# Patient Record
Sex: MALE | Race: WHITE | NOT HISPANIC OR LATINO | Employment: FULL TIME | URBAN - METROPOLITAN AREA
[De-identification: names, ages, dates, MRNs, and addresses within clinical notes are randomized per-mention and may not be internally consistent; named-entity substitution may affect disease eponyms.]

---

## 2017-03-15 ENCOUNTER — OFFICE VISIT (OUTPATIENT)
Dept: FAMILY MEDICINE | Facility: HOSPITAL | Age: 57
End: 2017-03-15
Attending: FAMILY MEDICINE
Payer: COMMERCIAL

## 2017-03-15 VITALS
DIASTOLIC BLOOD PRESSURE: 68 MMHG | HEART RATE: 76 BPM | WEIGHT: 194.88 LBS | HEIGHT: 72 IN | BODY MASS INDEX: 26.4 KG/M2 | SYSTOLIC BLOOD PRESSURE: 109 MMHG

## 2017-03-15 DIAGNOSIS — L98.9 SKIN LESION: ICD-10-CM

## 2017-03-15 DIAGNOSIS — N52.9 ERECTILE DYSFUNCTION, UNSPECIFIED ERECTILE DYSFUNCTION TYPE: ICD-10-CM

## 2017-03-15 DIAGNOSIS — L30.9 ECZEMA, UNSPECIFIED TYPE: ICD-10-CM

## 2017-03-15 DIAGNOSIS — Z12.11 ENCOUNTER FOR SCREENING COLONOSCOPY: ICD-10-CM

## 2017-03-15 DIAGNOSIS — Z00.00 ENCOUNTER FOR HEALTH MAINTENANCE EXAMINATION: Primary | ICD-10-CM

## 2017-03-15 PROCEDURE — 99214 OFFICE O/P EST MOD 30 MIN: CPT | Performed by: FAMILY MEDICINE

## 2017-03-15 RX ORDER — TADALAFIL 20 MG/1
20 TABLET ORAL
Qty: 10 TABLET | Refills: 12 | Status: SHIPPED | OUTPATIENT
Start: 2017-03-15 | End: 2017-03-15 | Stop reason: SDUPTHER

## 2017-03-15 RX ORDER — TRIAMCINOLONE ACETONIDE 1 MG/G
CREAM TOPICAL 2 TIMES DAILY
Qty: 45 G | Refills: 2 | Status: SHIPPED | OUTPATIENT
Start: 2017-03-15 | End: 2019-02-18

## 2017-03-15 RX ORDER — TADALAFIL 20 MG/1
20 TABLET ORAL
Qty: 10 TABLET | Refills: 12 | Status: SHIPPED | OUTPATIENT
Start: 2017-03-15 | End: 2018-08-08 | Stop reason: SDUPTHER

## 2017-03-15 NOTE — MR AVS SNAPSHOT
[facility]  [address], [address]  [place] [address]  Phone: [phone]  Fax: [phone]                  Aureliano Wilson   3/15/2017 4:00 PM   Office Visit    Description:  Male : 1960   Provider:  Shakeel Walton III, MD   Department:  [facility]           Reason for Visit     Follow-up           Diagnoses this Visit        Comments    Eczema, unspecified type    -  Primary     Encounter for screening colonoscopy         Erectile dysfunction, unspecified erectile dysfunction type         Skin lesion         Encounter for health maintenance examination                To Do List           Future Appointments        Provider Department Dept Phone    3/16/2017 5:00 PM APPOINTMENT LAB, [place] [facility] [phone]      Goals (5 Years of Data)     None      Follow-Up and Disposition     Return if symptoms worsen or fail to improve.       These Medications        Disp Refills Start End    triamcinolone acetonide 0.1% (KENALOG) 0.1 % cream 45 g 2 3/15/2017 3/25/2017    Apply topically 2 (two) times daily. - Topical (Top)    [facility] [address] [place] [address] [place]AN TREVIZO - [address] AT [facility] Kimberly Herrera Ph #: [phone]       tadalafil (CIALIS) 20 MG Tab 10 tablet 12 3/15/2017 3/15/2018    Take 1 tablet (20 mg total) by mouth every 72 hours as needed. - Oral    Pharmacy: [facility] [id number]  MAGAN TREVIZO - [address] AT [facility] [place]ly Herrera Ph #: [phone]         [facility]     [facility] -  Assistance  Registered nurses in the [facility] provide clinical advisement, health education, appointment booking, and other advisory services.  Call for this free service at [phone].             Medications           Message regarding Medications     Verify the changes and/or additions to your medication regime listed below are the same as  "discussed with your clinician today.  If any of these changes or additions are incorrect, please notify your healthcare provider.        START taking these NEW medications        Refills    triamcinolone acetonide 0.1% (KENALOG) 0.1 % cream 2    Sig: Apply topically 2 (two) times daily.    Class: Normal    Route: Topical (Top)           Verify that the below list of medications is an accurate representation of the medications you are currently taking.  If none reported, the list may be blank. If incorrect, please contact your healthcare provider. Carry this list with you in case of emergency.           Current Medications     tadalafil (CIALIS) 20 MG Tab Take 1 tablet (20 mg total) by mouth every 72 hours as needed.    triamcinolone acetonide 0.1% (KENALOG) 0.1 % cream Apply topically 2 (two) times daily.           Clinical Reference Information           Your Vitals Were     BP Pulse Height Weight BMI    109/68 76 5' 11.5" (1.816 m) 88.4 kg (194 lb 14.2 oz) 26.8 kg/m2      Blood Pressure          Most Recent Value    BP  109/68      Allergies as of 3/15/2017     No Known Allergies      Immunizations Administered on Date of Encounter - 3/15/2017     None      Orders Placed During Today's Visit      Normal Orders This Visit    Ambulatory referral to Dermatology     Ambulatory referral to Gastroenterology     Future Labs/Procedures Expected by Expires    CBC auto differential  3/15/2017 5/14/2018    Comprehensive metabolic panel  3/15/2017 5/14/2018    Lipid panel  3/15/2017 5/14/2018    TSH  3/15/2017 5/14/2018      Smoking Cessation     If you would like to quit smoking:   You may be eligible for free services if you are a [place] resident and started smoking cigarettes before September 1, 1988.  Call the Smoking Cessation Trust (SCT) toll free at (112) 454-7214 or (266) 919-3405.   Call 3-800-QUIT-NOW if you do not meet the above criteria.            Language Assistance Services     ATTENTION: Language " assistance services are available, free of charge. Please call [phone].      ATENCIÓN: Si habla temitope, tiene a richard disposición servicios gratuitos de asistencia lingüística. Llame al [phone].     CHÚ Ý: N?u b?n nói Ti?ng Vi?t, có các d?ch v? h? tr? ngôn ng? mi?n phí dành cho b?n. G?i s? [phone].         [facility] complies with applicable Federal civil rights laws and does not discriminate on the basis of race, color, national origin, age, disability, or sex.

## 2017-03-16 ENCOUNTER — LAB VISIT (OUTPATIENT)
Dept: LAB | Facility: HOSPITAL | Age: 57
End: 2017-03-16
Attending: FAMILY MEDICINE
Payer: COMMERCIAL

## 2017-03-16 DIAGNOSIS — Z00.00 ENCOUNTER FOR HEALTH MAINTENANCE EXAMINATION: ICD-10-CM

## 2017-03-16 LAB
ALBUMIN SERPL BCP-MCNC: 3.5 G/DL
ALP SERPL-CCNC: 56 U/L
ALT SERPL W/O P-5'-P-CCNC: 19 U/L
ANION GAP SERPL CALC-SCNC: 8 MMOL/L
AST SERPL-CCNC: 18 U/L
BASOPHILS # BLD AUTO: 0.05 K/UL
BASOPHILS NFR BLD: 1 %
BILIRUB SERPL-MCNC: 0.5 MG/DL
BUN SERPL-MCNC: 12 MG/DL
CALCIUM SERPL-MCNC: 9 MG/DL
CHLORIDE SERPL-SCNC: 105 MMOL/L
CHOLEST/HDLC SERPL: 4.8 {RATIO}
CO2 SERPL-SCNC: 25 MMOL/L
CREAT SERPL-MCNC: 0.9 MG/DL
DIFFERENTIAL METHOD: ABNORMAL
EOSINOPHIL # BLD AUTO: 0.2 K/UL
EOSINOPHIL NFR BLD: 4.2 %
ERYTHROCYTE [DISTWIDTH] IN BLOOD BY AUTOMATED COUNT: 12.7 %
EST. GFR  (AFRICAN AMERICAN): >60 ML/MIN/1.73 M^2
EST. GFR  (NON AFRICAN AMERICAN): >60 ML/MIN/1.73 M^2
GLUCOSE SERPL-MCNC: 95 MG/DL
HCT VFR BLD AUTO: 41.6 %
HDL/CHOLESTEROL RATIO: 20.7 %
HDLC SERPL-MCNC: 217 MG/DL
HDLC SERPL-MCNC: 45 MG/DL
HGB BLD-MCNC: 13.9 G/DL
LDLC SERPL CALC-MCNC: 149.8 MG/DL
LYMPHOCYTES # BLD AUTO: 1.5 K/UL
LYMPHOCYTES NFR BLD: 28.4 %
MCH RBC QN AUTO: 28.5 PG
MCHC RBC AUTO-ENTMCNC: 33.4 %
MCV RBC AUTO: 85 FL
MONOCYTES # BLD AUTO: 0.5 K/UL
MONOCYTES NFR BLD: 10.3 %
NEUTROPHILS # BLD AUTO: 2.9 K/UL
NEUTROPHILS NFR BLD: 55.9 %
NONHDLC SERPL-MCNC: 172 MG/DL
PLATELET # BLD AUTO: 232 K/UL
PMV BLD AUTO: 9.6 FL
POTASSIUM SERPL-SCNC: 3.7 MMOL/L
PROT SERPL-MCNC: 6.4 G/DL
RBC # BLD AUTO: 4.88 M/UL
SODIUM SERPL-SCNC: 138 MMOL/L
TRIGL SERPL-MCNC: 111 MG/DL
TSH SERPL DL<=0.005 MIU/L-ACNC: 1.43 UIU/ML
WBC # BLD AUTO: 5.25 K/UL

## 2017-03-16 PROCEDURE — 80061 LIPID PANEL: CPT

## 2017-03-16 PROCEDURE — 85025 COMPLETE CBC W/AUTO DIFF WBC: CPT

## 2017-03-16 PROCEDURE — 84443 ASSAY THYROID STIM HORMONE: CPT

## 2017-03-16 PROCEDURE — 80053 COMPREHEN METABOLIC PANEL: CPT

## 2017-03-16 PROCEDURE — 36415 COLL VENOUS BLD VENIPUNCTURE: CPT

## 2017-04-17 ENCOUNTER — OFFICE VISIT (OUTPATIENT)
Dept: NEUROLOGY | Facility: HOSPITAL | Age: 57
End: 2017-04-17
Attending: INTERNAL MEDICINE
Payer: COMMERCIAL

## 2017-04-17 VITALS
TEMPERATURE: 98 F | DIASTOLIC BLOOD PRESSURE: 75 MMHG | HEIGHT: 71 IN | BODY MASS INDEX: 26.88 KG/M2 | HEART RATE: 78 BPM | WEIGHT: 192 LBS | SYSTOLIC BLOOD PRESSURE: 110 MMHG

## 2017-04-17 DIAGNOSIS — Z12.11 ENCOUNTER FOR SCREENING COLONOSCOPY: Primary | ICD-10-CM

## 2017-04-17 PROCEDURE — 99214 OFFICE O/P EST MOD 30 MIN: CPT | Performed by: INTERNAL MEDICINE

## 2017-04-17 RX ORDER — POLYETHYLENE GLYCOL 3350, SODIUM SULFATE ANHYDROUS, SODIUM BICARBONATE, SODIUM CHLORIDE, POTASSIUM CHLORIDE 236; 22.74; 6.74; 5.86; 2.97 G/4L; G/4L; G/4L; G/4L; G/4L
4 POWDER, FOR SOLUTION ORAL ONCE
Qty: 4000 ML | Refills: 0 | Status: SHIPPED | OUTPATIENT
Start: 2017-04-17 | End: 2017-04-17

## 2017-04-17 NOTE — PATIENT INSTRUCTIONS
Nulytely Colonoscopy Prep Instructions    [facility] [place]   [address], [place] [address]  [phone]      PATIENT NAME:  Aureliano Allen    PROCEDURE DAY: Thursday May 4, 2017     *Your procedure time many change.  The hospital will contact you the day prior to   the procedure to confirm your procedure time and arrival.       CLEAR LIQUID DIET (START THE DAY BEFORE PROCEDURE):  Wednesday       Clear Liquid Diet means any liquid from the list below that is not red or purple in color:   Gatorade, Ramone-Aid, Lemonade (Yellow ONLY)-Gatorade is the preferred liquid   Tea (no milk or dairy)   Carbonated beverages (soft drink), regular or diet   Apple juice, white grape juice, white cranberry juice   Jell-O (orange, lemon, or lime flavors ONLY)   Clear, fat-free, beef or chicken broth   Bouillon, clear consommé   Snowball, Popsicles (NOT red or purple)  * No Solid Food or Alcohol     ITEMS TO BE PURCHASED FOR PREP (Nu-Lytely requires a prescription):         Nu-Lytely preparation solution.          Gas tablets (Gas-X, Mylanta Gas, Simethicone)     BOWEL PREP INSTRUCTIONS THE DAY BEFORE THE EXAM: Wednesday   1. Drink only clear liquids (see the above diet) all day. Gatorade is the best liquid.   Drink an extra 8 ounces of clear liquid every hour from 11am to 5pm.         2.   At 6pm, mix Nu-Lytely powder according to the directions on the container and               drink 8 ounces of solution every 10 minutes until about half of the solution is                consumed. Place the remainder of the solution in the refrigerator.         3.   At 9pm, take two gas tablets with 8 ounces of clear liquid.        4.   At 10pm, take two gas tablets with 8 ounces of clear liquid.      THE DAY OF THE EXAM: Thursday         1.  Beginning 5 hours before your procedure time, drink the remaining half of              Nu-Lytely solution. Drink 8 ounces of solution every 10 minutes until the solution               is gone.              *If your procedure is scheduled for the early morning, you will need to get up in               the middle of the night to take this dose of preparation. The correct timing of this               dose is essential to an effective preparation. If you do not take this dose, your               exam may be incomplete and need to be repeated.         2.  Have nothing to eat or drink for 3 hours before the procedure.         3.  Take your morning medications, if any, with a small sip of water.         4.  Bring someone to drive you home (you should not drive for 12 hrs after the exam)        5.  Report to Admitting, 1st floor hospital entrance 2 hours before procedure time.       If you are diabetic, do not take insulin or oral medications the morning of the procedure. Take only a half dose of insulin the day before your procedure. Do not take your diabetic pills the day of your procedure               Colonoscopy is used to view the inside of your lower digestive tract (colon and rectum). It can help screen for colon cancer and can also help find the source of abdominal pain, bleeding, and changes in bowel habits. The test is usually done in the hospital on an outpatient basis. During the exam, the doctor can remove a small tissue sample ( a biopsy) for testing. Small growths, such as polyps, may also be removed during colonoscopy.     A camera attached to a flexible tube with a viewing lens is used to take video pictures.    Getting Ready    Be sure to tell your doctor about any medications you take. Also tell your doctor about any health conditions you may have.   Discuss the risks of the test with your doctor. These include bleeding and bowel puncture.   Your rectum and colon must be empty for the test. So be sure to follow the diet and bowel prep instructions exactly. If you dont, the test may need to be rescheduled.   You will need to have a friend or family member prepared to drive you  home after the test.     Colonoscopy provides an inside view of the entire colon.    During the Test    You are given sedating (relaxing) medication through an IV line. You may be drowsy or completely asleep.   The procedure takes 30 minutes or longer.   The doctor performs a digital rectal exam to check for anal and rectal problems. The rectum is lubricated and the scope inserted.   If you are awake, you may have a feeling similar to needing to have a bowel movement. You may also feel pressure as air is pumped into the colon. Its okay to pass gas during the procedure.  After the Test    You may discuss the results with your doctor right away or at a future visit.   Try to pass all the gas right after the test to help prevent bloating and cramping.   After the test, you can go back to your normal eating and other activities.  Risks and Possible Complications Include:   Bleeding  A puncture or tear in the colon  Risks of anesthesia

## 2017-04-17 NOTE — MR AVS SNAPSHOT
[facility]  [address] [place] [address]  Phone: [phone]  Fax: [phone]                  Aureliano Wilson   2017 2:45 PM   Office Visit    Description:  Male : 1960   Provider:  Tung Cochran MD   Department:  [facility]           Reason for Visit     Consult           Diagnoses this Visit        Comments    Encounter for screening colonoscopy    -  Primary            To Do List           Goals (5 Years of Data)     None       These Medications        Disp Refills Start End    polyethylene glycol (GOLYTELY,NULYTELY) 236-22.74-6.74 -5.86 gram suspension 4000 mL 0 2017    Take 4,000 mLs (4 L total) by mouth once. - Oral    Pharmacy: [facility] [address] [place] [address] [address] AT [facility] Clif Herrera  #: [phone]         [facility] On Call     [facility] Care [facility] -  Assistance  Unless otherwise directed by your provider, please contact [facility], our nurse care line that is available for  assistance.     Registered nurses in the [facility] provide: appointment scheduling, clinical advisement, health education, and other advisory services.  Call: [phone] (toll free)               Medications           Message regarding Medications     Verify the changes and/or additions to your medication regime listed below are the same as discussed with your clinician today.  If any of these changes or additions are incorrect, please notify your healthcare provider.        START taking these NEW medications        Refills    polyethylene glycol (GOLYTELY,NULYTELY) 236-22.74-6.74 -5.86 gram suspension 0    Sig: Take 4,000 mLs (4 L total) by mouth once.    Class: Normal    Route: Oral           Verify that the below list of medications is an accurate representation of the medications you are currently taking.  If none reported, the list may be blank. If incorrect,  "please contact your healthcare provider. Carry this list with you in case of emergency.           Current Medications     polyethylene glycol (GOLYTELY,NULYTELY) 236-22.74-6.74 -5.86 gram suspension Take 4,000 mLs (4 L total) by mouth once.    tadalafil (CIALIS) 20 MG Tab Take 1 tablet (20 mg total) by mouth every 72 hours as needed.    triamcinolone acetonide 0.1% (KENALOG) 0.1 % cream Apply topically 2 (two) times daily.           Clinical Reference Information           Your Vitals Were     BP Pulse Temp Height Weight BMI    110/75 78 97.9 °F (36.6 °C) (Oral) 5' 11" (1.803 m) 87.1 kg (192 lb) 26.78 kg/m2      Blood Pressure          Most Recent Value    BP  110/75      Allergies as of 4/17/2017     No Known Allergies      Immunizations Administered on Date of Encounter - 4/17/2017     None      Orders Placed During Today's Visit      Normal Orders This Visit    Case request GI: COLONOSCOPY       Instructions    Nulyte Colonoscopy Prep Instructions    [facility] - [address] [place] [address]  [phone]      PATIENT NAME:  Aureliano Allen    PROCEDURE DAY: Thursday May 4, 2017     *Your procedure time many change.  The hospital will contact you the day prior to   the procedure to confirm your procedure time and arrival.       CLEAR LIQUID DIET (START THE DAY BEFORE PROCEDURE):  Wednesday       Clear Liquid Diet means any liquid from the list below that is not red or purple in color:   Gatorade, Ramone-Aid, Lemonade (Yellow ONLY)-Gatorade is the preferred liquid   Tea (no milk or dairy)   Carbonated beverages (soft drink), regular or diet   Apple juice, white grape juice, white cranberry juice   Jell-O (orange, lemon, or lime flavors ONLY)   Clear, fat-free, beef or chicken broth   Bouillon, clear consommé   Snowball, Popsicles (NOT red or purple)  * No Solid Food or Alcohol     ITEMS TO BE PURCHASED FOR PREP (Nu-Lytely requires a prescription):         Nu-Lytely " preparation solution.          Gas tablets (Gas-X, Mylanta Gas, Simethicone)     BOWEL PREP INSTRUCTIONS THE DAY BEFORE THE EXAM: Wednesday   1. Drink only clear liquids (see the above diet) all day. Gatorade is the best liquid.   Drink an extra 8 ounces of clear liquid every hour from 11am to 5pm.         2.   At 6pm, mix Nu-Lytely powder according to the directions on the container and               drink 8 ounces of solution every 10 minutes until about half of the solution is                consumed. Place the remainder of the solution in the refrigerator.         3.   At 9pm, take two gas tablets with 8 ounces of clear liquid.        4.   At 10pm, take two gas tablets with 8 ounces of clear liquid.      THE DAY OF THE EXAM: Thursday         1.  Beginning 5 hours before your procedure time, drink the remaining half of              Nu-Lytely solution. Drink 8 ounces of solution every 10 minutes until the solution              is gone.              *If your procedure is scheduled for the early morning, you will need to get up in               the middle of the night to take this dose of preparation. The correct timing of this               dose is essential to an effective preparation. If you do not take this dose, your               exam may be incomplete and need to be repeated.         2.  Have nothing to eat or drink for 3 hours before the procedure.         3.  Take your morning medications, if any, with a small sip of water.         4.  Bring someone to drive you home (you should not drive for 12 hrs after the exam)        5.  Report to Admitting, 1st floor hospital entrance 2 hours before procedure time.       If you are diabetic, do not take insulin or oral medications the morning of the procedure. Take only a half dose of insulin the day before your procedure. Do not take your diabetic pills the day of your procedure               Colonoscopy is used to view the inside of your lower digestive tract (colon  and rectum). It can help screen for colon cancer and can also help find the source of abdominal pain, bleeding, and changes in bowel habits. The test is usually done in the hospital on an outpatient basis. During the exam, the doctor can remove a small tissue sample ( a biopsy) for testing. Small growths, such as polyps, may also be removed during colonoscopy.     A camera attached to a flexible tube with a viewing lens is used to take video pictures.    Getting Ready    Be sure to tell your doctor about any medications you take. Also tell your doctor about any health conditions you may have.   Discuss the risks of the test with your doctor. These include bleeding and bowel puncture.   Your rectum and colon must be empty for the test. So be sure to follow the diet and bowel prep instructions exactly. If you dont, the test may need to be rescheduled.   You will need to have a friend or family member prepared to drive you home after the test.     Colonoscopy provides an inside view of the entire colon.    During the Test    You are given sedating (relaxing) medication through an IV line. You may be drowsy or completely asleep.   The procedure takes 30 minutes or longer.   The doctor performs a digital rectal exam to check for anal and rectal problems. The rectum is lubricated and the scope inserted.   If you are awake, you may have a feeling similar to needing to have a bowel movement. You may also feel pressure as air is pumped into the colon. Its okay to pass gas during the procedure.  After the Test    You may discuss the results with your doctor right away or at a future visit.   Try to pass all the gas right after the test to help prevent bloating and cramping.   After the test, you can go back to your normal eating and other activities.  Risks and Possible Complications Include:   Bleeding  A puncture or tear in the colon  Risks of anesthesia                Smoking Cessation     If you would like  to quit smoking:   You may be eligible for free services if you are a [place] resident and started smoking cigarettes before September 1, 1988.  Call the Smoking Cessation Trust (SCT) toll free at (069) 482-3640 or (232) 973-8893.   Call 1-800-QUIT-NOW if you do not meet the above criteria.   Contact us via email: [email or web address]   View our website for more information: [email or web address]        Language Assistance Services     ATTENTION: Language assistance services are available, free of charge. Please call [phone].      ATENCIÓN: Si habla español, tiene a richard disposición servicios gratuitos de asistencia lingüística. Llame al [phone].     CHÚ Ý: N?u b?n nói Ti?ng Vi?t, có các d?ch v? h? tr? ngôn ng? mi?n phí dành cho b?n. G?i s? [phone].         [facility] complies with applicable Federal civil rights laws and does not discriminate on the basis of race, color, national origin, age, disability, or sex.

## 2017-04-17 NOTE — PROGRESS NOTES
"[facility]    CC: fecal incontinence     HPI 57 y.o. male with PMH of erectile dysfunction who presents with 6 month history of intermittent fecal incontinence associated with nocturnal leakage but not associated with diarrhea or abdominal pain. Reports mild rectal bleeding on toilet tissue, not associated with straining.  He denies constipation, nausea, or vomiting.  He has never had a colonoscopy. No family history of colon cancer or gastric cancer.     PMH  Erectile Dysfunction    PSH  None    Social History  Rare alcohol use  1/2 PPD smoking x 30 years    FH  No family history of colon or gastric cancer    Review of Systems  General ROS: negative for chills, fever or weight loss  Psychological ROS: negative for hallucination, depression or suicidal ideation  Ophthalmic ROS: negative for blurry vision, photophobia or eye pain  ENT ROS: negative for epistaxis, sore throat or rhinorrhea  Respiratory ROS: no cough, shortness of breath, or wheezing  Cardiovascular ROS: no chest pain or dyspnea on exertion  Gastrointestinal ROS: no abdominal pain, change in bowel habits, no melena or hematochezia,+rectal bleeding, +fecal incontinence  Genito-Urinary ROS: no dysuria, trouble voiding, or hematuria  Musculoskeletal ROS: negative for gait disturbance or muscular weakness  Neurological ROS: no syncope or seizures; no ataxia  Dermatological ROS: negative for pruritis, rash and jaundice    Physical Examination  /75  Pulse 78  Temp 97.9 °F (36.6 °C) (Oral)   Ht 5' 11" (1.803 m)  Wt 87.1 kg (192 lb)  BMI 26.78 kg/m2  General appearance: alert, cooperative, no distress  HENT: Normocephalic, atraumatic, neck symmetrical, no nasal discharge   Eyes: conjunctivae/corneas clear, PERRL, EOM's intact  Lungs: clear to auscultation bilaterally, no dullness to percussion bilaterally  Heart: regular rate and rhythm without rub; no displacement of the PMI   Abdomen: soft, non-tender; bowel sounds normoactive; no " organomegaly  Extremities: extremities symmetric; no clubbing, cyanosis, or edema  Integument: Skin color, texture, turgor normal; no rashes; hair distrubution normal  Neurologic: Alert and oriented X 3, normal strength, normal coordination and gait  Psychiatric: no pressured speech; normal affect; no evidence of impaired cognition     Labs:  H/h 13.9/41.6  Cr 0.9     Imaging:  No imaging listed in system     Assessment: 57 year old WM with fecal incontinence and need for screening colonoscopy.    Plan:   -Trial of daily Immodium for occasional mild noctural fecal incontinence.   -Colonoscopy scheduled for May 4th.  Reassess for improvement of symptoms at that time.  Will evaluate for structural abnormality as possible cause of fecal incontinence, as well as perform full screening colonoscopy.      Tung Cochran MD   [address] [place] [address], [address]   [place] [address] [phone]

## 2017-04-18 ENCOUNTER — TELEPHONE (OUTPATIENT)
Dept: NEUROLOGY | Facility: HOSPITAL | Age: 57
End: 2017-04-18

## 2017-04-18 NOTE — TELEPHONE ENCOUNTER
----- Message from Frida Joseph LPN sent at 4/17/2017  3:59 PM CDT -----  Pt scheduled 5/4 for c-scope  Cpt- g0121  Dx- z12.11  Thanks

## 2017-04-24 ENCOUNTER — TELEPHONE (OUTPATIENT)
Dept: NEUROLOGY | Facility: HOSPITAL | Age: 57
End: 2017-04-24

## 2017-04-24 NOTE — TELEPHONE ENCOUNTER
----- Message from Alexandra Simmons sent at 4/24/2017 12:50 PM CDT -----  Contact: Patients wife, Araceli RICHTER- Patients wife called to  Reschedule the Colonoscopy. Please call Araceli Wilson at [phone].

## 2017-04-24 NOTE — TELEPHONE ENCOUNTER
Returned pts wife call. Wishing to reschedule screening colonoscopy. Offered date of 5/8/2017 to which Araceli and pt accept. Scheduling notified and auth requested.

## 2017-04-25 ENCOUNTER — TELEPHONE (OUTPATIENT)
Dept: NEUROLOGY | Facility: HOSPITAL | Age: 57
End: 2017-04-25

## 2017-04-25 NOTE — TELEPHONE ENCOUNTER
----- Message from Janeth Briones LPN sent at 4/24/2017  1:15 PM CDT -----  David Zuniga,   Can we please change the date on this pts procedure? Changing to 5/8  DX: Z12.11  CPT:     Thanks!  Janeth

## 2017-05-02 ENCOUNTER — TELEPHONE (OUTPATIENT)
Dept: NEUROLOGY | Facility: HOSPITAL | Age: 57
End: 2017-05-02

## 2017-05-02 DIAGNOSIS — Z12.11 SCREEN FOR COLON CANCER: Primary | ICD-10-CM

## 2017-05-02 NOTE — TELEPHONE ENCOUNTER
----- Message from Isadora Blue sent at 5/2/2017 11:25 AM CDT -----  GI- Patient's wife (Araceli) called regarding her husbands colonoscopy that was suppose to be changed to May 8. Please call Araceli back at [phone]. Thanks.

## 2017-05-02 NOTE — TELEPHONE ENCOUNTER
Returned pts wife call. Araceli states that c-scope was originally scheduled for 5/4 and called on 4/24 and rescheduled to 5/8. Apologized to pts wife as it appears that case never got moved to 5/8 due to Dr. Cochran not being available for procedures that day. Pts wife selected date of 6/5 to reschedule procedure. Will notify surgery scheduling.

## 2017-06-05 ENCOUNTER — HOSPITAL ENCOUNTER (OUTPATIENT)
Facility: HOSPITAL | Age: 57
Discharge: HOME | End: 2017-06-05
Attending: INTERNAL MEDICINE | Admitting: INTERNAL MEDICINE
Payer: COMMERCIAL

## 2017-06-05 ENCOUNTER — ANESTHESIA (OUTPATIENT)
Dept: ENDOSCOPY | Facility: HOSPITAL | Age: 57
End: 2017-06-05
Payer: COMMERCIAL

## 2017-06-05 ENCOUNTER — ANESTHESIA EVENT (OUTPATIENT)
Dept: ENDOSCOPY | Facility: HOSPITAL | Age: 57
End: 2017-06-05
Payer: COMMERCIAL

## 2017-06-05 ENCOUNTER — SURGERY (OUTPATIENT)
Age: 57
End: 2017-06-05

## 2017-06-05 VITALS
HEIGHT: 71 IN | RESPIRATION RATE: 16 BRPM | WEIGHT: 188 LBS | SYSTOLIC BLOOD PRESSURE: 127 MMHG | DIASTOLIC BLOOD PRESSURE: 88 MMHG | OXYGEN SATURATION: 99 % | TEMPERATURE: 98 F | HEART RATE: 75 BPM | BODY MASS INDEX: 26.32 KG/M2

## 2017-06-05 DIAGNOSIS — Z12.11 SCREENING FOR COLON CANCER: Primary | ICD-10-CM

## 2017-06-05 DIAGNOSIS — K63.5 POLYP OF COLON, UNSPECIFIED PART OF COLON, UNSPECIFIED TYPE: ICD-10-CM

## 2017-06-05 PROCEDURE — 37000008 HC ANESTHESIA 1ST 15 MINUTES: Performed by: INTERNAL MEDICINE

## 2017-06-05 PROCEDURE — 88305 TISSUE EXAM BY PATHOLOGIST: CPT | Performed by: PATHOLOGY

## 2017-06-05 PROCEDURE — 25000003 PHARM REV CODE 250: Performed by: INTERNAL MEDICINE

## 2017-06-05 PROCEDURE — 45385 COLONOSCOPY W/LESION REMOVAL: CPT | Performed by: INTERNAL MEDICINE

## 2017-06-05 PROCEDURE — 25000003 PHARM REV CODE 250: Performed by: NURSE ANESTHETIST, CERTIFIED REGISTERED

## 2017-06-05 PROCEDURE — 27201012 HC FORCEPS, HOT/COLD, DISP: Performed by: INTERNAL MEDICINE

## 2017-06-05 PROCEDURE — 27201089 HC SNARE, DISP (ANY): Performed by: INTERNAL MEDICINE

## 2017-06-05 PROCEDURE — 45380 COLONOSCOPY AND BIOPSY: CPT | Performed by: INTERNAL MEDICINE

## 2017-06-05 PROCEDURE — 37000009 HC ANESTHESIA EA ADD 15 MINS: Performed by: INTERNAL MEDICINE

## 2017-06-05 PROCEDURE — 63600175 PHARM REV CODE 636 W HCPCS: Performed by: NURSE ANESTHETIST, CERTIFIED REGISTERED

## 2017-06-05 PROCEDURE — 88305 TISSUE EXAM BY PATHOLOGIST: CPT | Mod: 26,,, | Performed by: PATHOLOGY

## 2017-06-05 RX ORDER — SODIUM CHLORIDE 9 MG/ML
INJECTION, SOLUTION INTRAVENOUS CONTINUOUS
Status: DISCONTINUED | OUTPATIENT
Start: 2017-06-05 | End: 2017-06-13 | Stop reason: HOSPADM

## 2017-06-05 RX ORDER — PROPOFOL 10 MG/ML
VIAL (ML) INTRAVENOUS
Status: DISCONTINUED | OUTPATIENT
Start: 2017-06-05 | End: 2017-06-05

## 2017-06-05 RX ORDER — PROPOFOL 10 MG/ML
VIAL (ML) INTRAVENOUS CONTINUOUS PRN
Status: DISCONTINUED | OUTPATIENT
Start: 2017-06-05 | End: 2017-06-05

## 2017-06-05 RX ORDER — LIDOCAINE HCL/PF 100 MG/5ML
SYRINGE (ML) INTRAVENOUS
Status: DISCONTINUED | OUTPATIENT
Start: 2017-06-05 | End: 2017-06-05

## 2017-06-05 RX ADMIN — PROPOFOL 150 MCG/KG/MIN: 10 INJECTION, EMULSION INTRAVENOUS at 10:06

## 2017-06-05 RX ADMIN — PROPOFOL 70 MG: 10 INJECTION, EMULSION INTRAVENOUS at 10:06

## 2017-06-05 RX ADMIN — SODIUM CHLORIDE: 0.9 INJECTION, SOLUTION INTRAVENOUS at 10:06

## 2017-06-05 RX ADMIN — LIDOCAINE HYDROCHLORIDE 80 MG: 20 INJECTION, SOLUTION INTRAVENOUS at 10:06

## 2017-06-05 RX ADMIN — PROPOFOL 30 MG: 10 INJECTION, EMULSION INTRAVENOUS at 10:06

## 2017-06-05 NOTE — ANESTHESIA POSTPROCEDURE EVALUATION
"Anesthesia Post Evaluation    Patient: Aureliano Wilson    Procedure(s) Performed: Procedure(s) (LRB):  COLONOSCOPY (N/A)    Final Anesthesia Type: MAC  Patient location during evaluation: GI PACU  Patient participation: Yes- Able to Participate  Level of consciousness: awake and alert and oriented  Post-procedure vital signs: reviewed and stable  Pain management: adequate  Airway patency: patent  PONV status at discharge: No PONV  Anesthetic complications: no      Cardiovascular status: blood pressure returned to baseline, hemodynamically stable and stable  Respiratory status: unassisted, spontaneous ventilation and room air  Hydration status: euvolemic  Follow-up not needed.        Visit Vitals  /74 (BP Location: Left arm, Patient Position: Lying, BP Method: Automatic)   Pulse 77   Temp 36.6 °C (97.9 °F) (Oral)   Resp 18   Ht 5' 11" (1.803 m)   Wt 85.3 kg (188 lb)   SpO2 99%   BMI 26.22 kg/m²       Pain/Koby Score: Pain Assessment Performed: Yes (6/5/2017 10:20 AM)  Presence of Pain: denies (6/5/2017 10:20 AM)      "

## 2017-06-05 NOTE — H&P
[facility]    CC: fecal incontinence    HPI 57 y.o. male PMH of erectile dysfunction who presents with 6 month history of intermittent fecal incontinence associated with nocturnal leakage but not associated with diarrhea or abdominal pain. Reports mild rectal bleeding on toilet tissue, not associated with straining.  He denies constipation, nausea, or vomiting.  He has never had a colonoscopy. No family history of colon cancer or gastric cancer.       Past Medical History:   Diagnosis Date    Erectile dysfunction          Review of Systems  General ROS: negative for chills, fever or weight loss  Cardiovascular ROS: no chest pain or dyspnea on exertion  Gastrointestinal ROS: no abdominal pain, no blood in stooll mild fecal incontinence    Physical Examination  There were no vitals taken for this visit.  General appearance: alert, cooperative, no distress  HENT: Normocephalic, atraumatic, neck symmetrical, no nasal discharge   Lungs: clear to auscultation bilaterally, no dullness to percussion bilaterally  Heart: regular rate and rhythm without rub; no displacement of the PMI   Abdomen: soft, non-tender; bowel sounds normoactive; no organomegaly  Extremities: extremities symmetric; no clubbing, cyanosis, or edema  Neurologic: Alert and oriented X 3, normal strength, normal coordination and gait    Labs:  Lab Results   Component Value Date    WBC 5.25 03/16/2017    HGB 13.9 (L) 03/16/2017    HCT 41.6 03/16/2017    MCV 85 03/16/2017     03/16/2017         Imaging:  none    Assessment:   57 year old WM with fecal incontinence and need for screening colonoscopy.       Plan: Colonoscopy today      Tung Cochran MD   [address] [facility], [address]   [place] [address] [phone]

## 2017-06-05 NOTE — TRANSFER OF CARE
"Anesthesia Transfer of Care Note    Patient: Aureliano Wilson    Procedure(s) Performed: Procedure(s) (LRB):  COLONOSCOPY (N/A)    Patient location: GI    Anesthesia Type: MAC    Transport from OR: Transported from OR on room air with adequate spontaneous ventilation    Post pain: adequate analgesia    Post assessment: no apparent anesthetic complications and tolerated procedure well    Post vital signs: stable    Level of consciousness: awake, alert and oriented    Nausea/Vomiting: no nausea/vomiting    Complications: none    Transfer of care protocol was followed      Last vitals:   Visit Vitals  /74 (BP Location: Left arm, Patient Position: Lying, BP Method: Automatic)   Pulse 77   Temp 36.6 °C (97.9 °F) (Oral)   Resp 18   Ht 5' 11" (1.803 m)   Wt 85.3 kg (188 lb)   SpO2 99%   BMI 26.22 kg/m²     "

## 2017-06-05 NOTE — DISCHARGE INSTRUCTIONS
Discharge Summary/Instructions for after Colonoscopy with Biopsy/Polypectomy    Aureliano Wilson  6/5/2017  Tung Cochran MD    Restrictions on Activity:    - Do not drive car or operate machinery until the day after the procedure.  - The following day: return to full activity including work.  - For 3 days: No heavy lifting, straining or running.  - Diet: Eat and drink normally unless instructed otherwise.    Treatment for Common Side Effects:  - Mild abdominal pain and bloating or excessive gas: rest, eat lightly and use a heating pad.     Symptoms to watch for and report to your physician:  1. Severe abdominal pain.  2. Fever within 24 hours after a procedure.  3. A large amount of rectal bleeding. (A small amount of blood from the rectum is not serious, especially if hemorrhoids are present.)  4. Because air was put into your colon during the procedure, expelling large amount of air from your rectum is normal.  5. You may not have a bowel movement for 1-3 days because of the colonoscopy prep. This is normal.  6. Go directly to the emergency room if you notice any of the following:     Chills and/or fever over 101   Persistent vomiting   Severe abdominal pain, other than gas cramps   Severe chest pain   Black, tarry stools   Any bleeding - exceeding one tablespoon    If you have any questions or problems, please call your Physician:    Tung Cochran MD      Lab Results: Contact Physician's Office      If a complication or emergency situation arises and you are unable to reach your Physician - GO TO THE EMERGENCY ROOM.

## 2017-06-05 NOTE — ANESTHESIA PREPROCEDURE EVALUATION
06/05/2017  Aureliano Wilson is a 57 y.o., male for colonoscopy under MAC    Anesthesia Evaluation     I have reviewed the Nursing Notes.   I have reviewed the Medications.     Review of Systems  Social:  Smoker, No Alcohol Use   Cardiovascular:   Exercise tolerance: good    Hepatic/GI:   Bowel Prep.        Physical Exam  General:  Well nourished       Chest/Lungs:  Chest/Lungs Clear    Heart/Vascular:  Heart Findings: Normal            Anesthesia Plan  Type of Anesthesia, risks & benefits discussed:  Anesthesia Type:  MAC  Patient's Preference:   Intra-op Monitoring Plan:   Intra-op Monitoring Plan Comments:   Post Op Pain Control Plan:   Post Op Pain Control Plan Comments:   Induction:    Beta Blocker:  Patient is not currently on a Beta-Blocker (No further documentation required).       Informed Consent: Patient understands risks and agrees with Anesthesia plan.  Questions answered. Anesthesia consent signed with patient.  ASA Score: 2     Day of Surgery Review of History & Physical:            Ready For Surgery From Anesthesia Perspective.

## 2017-08-07 NOTE — PROGRESS NOTES
"Subjective:       Patient ID: Aureliano Wilson is a 57 y.o. male.    Chief Complaint: Follow-up    HPI      Aureliano Wilson is a 57 y.o. male who presents for an annual health visit.        Review of Systems   Genitourinary:        ED   Skin: Positive for rash.   All other systems reviewed and are negative.      Cardiac risk factors: advanced age (older than 55 for men, 65 for women), male gender, sedentary lifestyle and smoking/ tobacco exposure.    Depression Screen  (Note: if answer to either of the following is "Yes", a more complete depression screening is indicated)   Q1: Over the past two weeks, have you felt down, depressed or hopeless? no  Q2: Over the past two weeks, have you felt little interest or pleasure in doing things? no    Falls Risk Assessment- Falls Risk/Prevention discussed: Yes      Pain Assessment- denies pain at this time     Past Medical History:   Diagnosis Date    Erectile dysfunction        Family History   Problem Relation Age of Onset    Diabetes Father     Stroke Father     Diabetes Mother        Social History     Social History    Marital status:      Spouse name: N/A    Number of children: N/A    Years of education: N/A     Social History Main Topics    Smoking status: Current Every Day Smoker     Packs/day: 0.25     Years: 30.00    Smokeless tobacco: None      Comment: 5 cigarettes a day    Alcohol use Yes      Comment: socially    Drug use: No    Sexual activity: Yes     Partners: Female     Other Topics Concern    None     Social History Narrative    None       Past Surgical History:    none     Current Outpatient Prescriptions:     tadalafil (CIALIS) 20 MG Tab, Take 1 tablet (20 mg total) by mouth every 72 hours as needed., Disp: 10 tablet, Rfl: 12    triamcinolone acetonide 0.1% (KENALOG) 0.1 % cream, Apply topically 2 (two) times daily., Disp: 45 g, Rfl: 2       Immunization status: needs influenza.  Objective:      Blood pressure 109/68, pulse 76, height " "5' 11.5" (1.816 m), weight 88.4 kg (194 lb 14.2 oz).  Body mass index is 26.8 kg/m².    Physical Exam   Constitutional: He is oriented to person, place, and time. He appears well-developed and well-nourished.   HENT:   Head: Normocephalic and atraumatic.   Mouth/Throat: Oropharynx is clear and moist.   Eyes: Conjunctivae and EOM are normal. Pupils are equal, round, and reactive to light. No scleral icterus.   Neck: Normal range of motion. Neck supple. No thyromegaly present.   Cardiovascular: Normal rate, regular rhythm, normal heart sounds and intact distal pulses.    Pulmonary/Chest: Effort normal and breath sounds normal.   Abdominal: Soft. Bowel sounds are normal.   Musculoskeletal: Normal range of motion.   Neurological: He is alert and oriented to person, place, and time.   Skin: Capillary refill takes less than 2 seconds. Rash noted.   Psychiatric: He has a normal mood and affect. His behavior is normal. Judgment and thought content normal.       Assessment:       1. Encounter for health maintenance examination    2. Eczema, unspecified type    3. Encounter for screening colonoscopy    4. Erectile dysfunction, unspecified erectile dysfunction type    5. Skin lesion        Plan:       Encounter for health maintenance examination  -     CBC auto differential; Future; Expected date: 03/15/2017  -     TSH; Future; Expected date: 03/15/2017  -     Comprehensive metabolic panel; Future; Expected date: 03/15/2017  -     Lipid panel; Future; Expected date: 03/15/2017    Eczema, unspecified type  -     triamcinolone acetonide 0.1% (KENALOG) 0.1 % cream; Apply topically 2 (two) times daily.  Dispense: 45 g; Refill: 2    Encounter for screening colonoscopy  -     Ambulatory referral to Gastroenterology    Erectile dysfunction, unspecified erectile dysfunction type  -     Discontinue: tadalafil (CIALIS) 20 MG Tab; Take 1 tablet (20 mg total) by mouth every 72 hours as needed.  Dispense: 10 tablet; Refill: 12  -     " tadalafil (CIALIS) 20 MG Tab; Take 1 tablet (20 mg total) by mouth every 72 hours as needed.  Dispense: 10 tablet; Refill: 12    Skin lesion  -     Ambulatory referral to Dermatology      During the course of the visit the patient was educated and counseled about appropriate screening and preventive services including:   · Colorectal cancer screening    Assistance with smoking cessation was offered, including:  [x]  Medications  [x]  Counseling  [x]  Printed Information on Smoking Cessation  [x]  Referral to a Smoking Cessation Program    Patient was counseled regarding smoking for 3-10 minutes.    Return if symptoms worsen or fail to improve.

## 2018-02-16 ENCOUNTER — PATIENT MESSAGE (OUTPATIENT)
Dept: FAMILY MEDICINE | Facility: HOSPITAL | Age: 58
End: 2018-02-16

## 2018-02-28 ENCOUNTER — HOSPITAL ENCOUNTER (OUTPATIENT)
Dept: RADIOLOGY | Facility: HOSPITAL | Age: 58
Discharge: HOME | End: 2018-02-28
Attending: FAMILY MEDICINE
Payer: COMMERCIAL

## 2018-02-28 ENCOUNTER — OFFICE VISIT (OUTPATIENT)
Dept: FAMILY MEDICINE | Facility: HOSPITAL | Age: 58
End: 2018-02-28
Attending: FAMILY MEDICINE
Payer: COMMERCIAL

## 2018-02-28 VITALS
SYSTOLIC BLOOD PRESSURE: 125 MMHG | HEIGHT: 71 IN | BODY MASS INDEX: 26.15 KG/M2 | RESPIRATION RATE: 20 BRPM | DIASTOLIC BLOOD PRESSURE: 76 MMHG | HEART RATE: 78 BPM | WEIGHT: 186.75 LBS

## 2018-02-28 DIAGNOSIS — L57.0 ACTINIC KERATOSIS: ICD-10-CM

## 2018-02-28 DIAGNOSIS — R05.9 COUGH: Primary | ICD-10-CM

## 2018-02-28 DIAGNOSIS — R05.9 COUGH: ICD-10-CM

## 2018-02-28 PROCEDURE — 71046 X-RAY EXAM CHEST 2 VIEWS: CPT | Mod: 26,,, | Performed by: RADIOLOGY

## 2018-02-28 PROCEDURE — 71046 X-RAY EXAM CHEST 2 VIEWS: CPT | Mod: TC,FY

## 2018-02-28 PROCEDURE — 99214 OFFICE O/P EST MOD 30 MIN: CPT | Mod: 25 | Performed by: FAMILY MEDICINE

## 2018-02-28 NOTE — PATIENT INSTRUCTIONS
Understanding Actinic Keratosis     Wear a hat that protects your face and ears from the sun.     Actinic keratosis is a skin growth caused by sun damage. These growths are not cancer, but they may develop into skin cancer (precancerous). Many people get these growths, especially as they age. This is because of cumulative sun exposure over years. Multiple growths are called actinic keratoses.  What causes actinic keratosis?  Sun damage causes actinic keratosis. These growths usually appear on skin that is most often exposed to the sun, such as the face, ears, or back of the hands. People who easily sunburn are likely to develop actinic keratoses. Actinic keratoses often appear later in life from cumulative, extensive sun exposure.  What are the symptoms of actinic keratosis?  Actinic keratosis growths may be described as:  · Rough, like sandpaper  · Wart-like  · Scaly or scabby  · More easily felt than seen  They may appear singly or in clusters. They may start out as red patches, and the skin around them may be discolored.  Actinic keratoses usually are not painful. For some people they may make the skin feel tender.  How is actinic keratosis treated?  Because actinic keratoses may develop into skin cancer, a healthcare provider should look at them. Your healthcare provider may choose to remove one or more of these growths. It may be examined under a microscope. This is called a biopsy. You may also wish to have actinic keratoses removed if they bother you. They can be removed in several ways:  · By using a medicine on the skin such as 5 fluorouracil or imiquimod  · By removing them with a scalpel  · By freezing them with liquid nitrogen  How can I prevent actinic keratoses?  Avoiding sun damage to your skin is the best way to prevent actinic keratoses. Here are some ways to protect your skin:  · Use sunscreen with an SPF of 30 or higher on exposed skin when you are outside.  · Wear a hat to protect your face and  scalp. Consider wearing clothing that covers your arms and legs.  · Avoid the sun in the middle of the day, when sunlight is most direct.  · Be aware of how long you have been out in the sun. Reapply sunscreen according to package directions.  · Do not use tanning beds.  What are the complications of actinic keratosis?  Actinic keratoses are a sign of skin damage. They may become cancerous. Its a good idea to ask your healthcare provider to check new skin growths. Report any skin problem that concerns you.  When should I call my healthcare provider?  Call your healthcare provider right away if any of these occur:  · You have an actinic keratosis sore that does not respond to treatment  · You have an actinic keratosis sore that does not heal within a few weeks, or heals and then comes back  · You have a skin growth that is changing in size, shape, or color  · You have a skin growth that looks different on one side from the other  · You have a skin growth that is not the same color throughout   Date Last Reviewed: 5/1/2016  © 3877-0838 [employer]. [address] [place] [address], [place] [address]. All rights reserved. This information is not intended as a substitute for professional medical care. Always follow your healthcare professional's instructions.

## 2018-02-28 NOTE — PROGRESS NOTES
Subjective:       Patient ID: Aureliano Wilson is a 58 y.o. male.    Chief Complaint: Follow-up    HPI     He c/o coughing, congestion, chest, and back pains; no fever, which began 7-10 days ago.  Pt used a diffuser and vaporizer containing eucalyptus that helps with the congestion as well as a heating pad for the back pain in located in upper quadrant of my back; however, the back pain migrated to the right side of my body and feel as though the pain is now emminating from internal organs.    Review of Systems   Respiratory: Positive for cough.    All other systems reviewed and are negative.      Past Medical History:   Diagnosis Date    Erectile dysfunction        Family History   Problem Relation Age of Onset    Diabetes Father     Stroke Father     Diabetes Mother        Social History     Social History    Marital status:      Spouse name: N/A    Number of children: N/A    Years of education: N/A     Social History Main Topics    Smoking status: Current Every Day Smoker     Packs/day: 0.25     Years: 30.00    Smokeless tobacco: None      Comment: 5 cigarettes a day    Alcohol use Yes      Comment: socially    Drug use: No    Sexual activity: Yes     Partners: Female     Other Topics Concern    None     Social History Narrative    None       Past Surgical History:   Procedure Laterality Date    COLONOSCOPY N/A 6/5/2017    Procedure: COLONOSCOPY;  Surgeon: Tung Cochran MD;  Location: [facility];  Service: Endoscopy;  Laterality: N/A;         Current Outpatient Prescriptions:     tadalafil (CIALIS) 20 MG Tab, Take 1 tablet (20 mg total) by mouth every 72 hours as needed., Disp: 10 tablet, Rfl: 12    triamcinolone acetonide 0.1% (KENALOG) 0.1 % cream, Apply topically 2 (two) times daily., Disp: 45 g, Rfl: 2     Objective:      Vitals:    02/28/18 1617   BP: 125/76   Pulse: 78   Resp: 20     Physical Exam   Constitutional: He is oriented to person, place, and time. He appears  well-nourished.   HENT:   Head: Normocephalic and atraumatic.   Eyes: Conjunctivae are normal.   Neck: Normal range of motion. Neck supple.   Cardiovascular: Normal rate, regular rhythm, normal heart sounds and intact distal pulses.    Pulmonary/Chest: Effort normal and breath sounds normal.   Abdominal: Soft. Bowel sounds are normal.   Musculoskeletal: Normal range of motion.   Neurological: He is alert and oriented to person, place, and time.   Skin: Skin is warm and dry. Capillary refill takes less than 2 seconds. Lesion and rash noted. Rash is maculopapular.   Psychiatric: He has a normal mood and affect. His behavior is normal. Judgment and thought content normal.         Assessment:       1. Cough    2. Actinic keratosis        Plan:       Cough  -     CBC auto differential; Future; Expected date: 02/28/2018  -     Comprehensive metabolic panel; Future; Expected date: 02/28/2018  -     X-Ray Chest PA And Lateral; Future; Expected date: 02/28/2018    Actinic keratosis  -     Ambulatory referral to Dermatology      Follow-up in about 4 weeks (around 3/28/2018).

## 2018-08-08 DIAGNOSIS — N52.9 ERECTILE DYSFUNCTION, UNSPECIFIED ERECTILE DYSFUNCTION TYPE: ICD-10-CM

## 2018-08-08 RX ORDER — TADALAFIL 20 MG/1
TABLET, FILM COATED ORAL
Qty: 10 TABLET | Refills: 0 | Status: SHIPPED | OUTPATIENT
Start: 2018-08-08 | End: 2021-12-06

## 2018-12-12 ENCOUNTER — OFFICE VISIT (OUTPATIENT)
Dept: FAMILY MEDICINE | Facility: HOSPITAL | Age: 58
End: 2018-12-12
Attending: FAMILY MEDICINE
Payer: COMMERCIAL

## 2018-12-12 VITALS
HEIGHT: 72 IN | HEART RATE: 86 BPM | WEIGHT: 195.56 LBS | DIASTOLIC BLOOD PRESSURE: 83 MMHG | SYSTOLIC BLOOD PRESSURE: 128 MMHG | BODY MASS INDEX: 26.49 KG/M2

## 2018-12-12 DIAGNOSIS — N45.1 EPIDIDYMITIS: Primary | ICD-10-CM

## 2018-12-12 PROCEDURE — 99213 OFFICE O/P EST LOW 20 MIN: CPT | Performed by: FAMILY MEDICINE

## 2018-12-12 RX ORDER — LEVOFLOXACIN 500 MG/1
500 TABLET, FILM COATED ORAL DAILY
Qty: 10 TABLET | Refills: 0 | Status: SHIPPED | OUTPATIENT
Start: 2018-12-12 | End: 2018-12-22

## 2018-12-12 NOTE — PROGRESS NOTES
Subjective:       Patient ID: Aureliano Wilson is a 58 y.o. male.    Chief Complaint: Testicle Pain    Mr. Wilson is a 58 year old male who presents urgently for testicular pain. States it started two weeks ago after his putting on his pants. States the pain is new. Reports discomfort with sitting, worse with movement and irritation. Denies any swelling or rash. Not on any medications currently, used to take cialis.     States he was in [place], [place]. States he had a prostate biopsy about six years ago, states he was diagnosed with prostatitis. States he occasionally has issues starting his stream, incomplete emptying.     No family history of prostate cancer.       Review of Systems   Constitutional: Negative for fever.   Neurological: Negative for headaches.       Objective:      Vitals:    12/12/18 0940   BP: 128/83   Pulse: 86     Physical Exam   Constitutional: He appears well-developed and well-nourished. No distress.   Genitourinary:   Genitourinary Comments: Normal penis, normal testicles, TTP over epididymus. No rash or abnormal skin findings.          Assessment:       1. Epididymitis        Plan:       Epididymitis  -     levoFLOXacin (LEVAQUIN) 500 MG tablet; Take 1 tablet (500 mg total) by mouth once daily. for 10 days  Dispense: 10 tablet; Refill: 0    Low suspicion for torsion or cancer based on exam. Will trial ten days of abx for likely epididymitis with f/u in two weeks. Consider ultrasound/rectal exam if symptoms continue. Counseled patient on BPH symptoms and medications as well.     Follow-up in about 2 weeks (around 12/26/2018).

## 2019-01-03 ENCOUNTER — OFFICE VISIT (OUTPATIENT)
Dept: FAMILY MEDICINE | Facility: HOSPITAL | Age: 59
End: 2019-01-03
Attending: FAMILY MEDICINE
Payer: COMMERCIAL

## 2019-01-03 VITALS
DIASTOLIC BLOOD PRESSURE: 74 MMHG | BODY MASS INDEX: 26.49 KG/M2 | WEIGHT: 195.56 LBS | HEART RATE: 62 BPM | SYSTOLIC BLOOD PRESSURE: 137 MMHG | HEIGHT: 72 IN

## 2019-01-03 DIAGNOSIS — N45.1 EPIDIDYMITIS: ICD-10-CM

## 2019-01-03 DIAGNOSIS — N52.9 ERECTILE DYSFUNCTION, UNSPECIFIED ERECTILE DYSFUNCTION TYPE: Primary | ICD-10-CM

## 2019-01-03 PROCEDURE — 99213 OFFICE O/P EST LOW 20 MIN: CPT | Performed by: FAMILY MEDICINE

## 2019-01-03 RX ORDER — SILDENAFIL 100 MG/1
100 TABLET, FILM COATED ORAL DAILY PRN
Qty: 6 TABLET | Refills: 0 | Status: SHIPPED | OUTPATIENT
Start: 2019-01-03 | End: 2021-12-06

## 2019-01-03 NOTE — PROGRESS NOTES
Subjective:       Patient ID: Aureliano Wilson is a 58 y.o. male.    Chief Complaint: Epididymitis (follow-up )    Mr. Wilson is a 58 year old male who presents for epididymitis follow-up. Patient reports he took his ten day coarse of abx. Pain has improved a lot. Has a light sensation when crossing his legs.     States he is still having some ED, Cialis isn't working well. States he will take it on a Friday and maybe last for five minutes. States he tried Viagra many years ago and didn't work well.       Review of Systems   Constitutional: Negative for chills and fever.   HENT: Negative for congestion and sore throat.        Objective:      Vitals:    01/03/19 1551   BP: 137/74   Pulse: 62     Physical Exam   Constitutional: He is oriented to person, place, and time. He appears well-developed and well-nourished. No distress.   HENT:   Head: Normocephalic and atraumatic.   Right Ear: External ear normal.   Left Ear: External ear normal.   Mouth/Throat: No oropharyngeal exudate.   Eyes: Conjunctivae and EOM are normal. Right eye exhibits no discharge. Left eye exhibits no discharge.   Cardiovascular: Normal rate, regular rhythm and normal heart sounds. Exam reveals no gallop and no friction rub.   No murmur heard.  Pulmonary/Chest: Effort normal and breath sounds normal. No respiratory distress.   Musculoskeletal: He exhibits no edema or deformity.   Neurological: He is alert and oriented to person, place, and time.   Skin: Skin is warm and dry. He is not diaphoretic.   Psychiatric: He has a normal mood and affect. His behavior is normal.   Nursing note and vitals reviewed.      Assessment:       1. Erectile dysfunction, unspecified erectile dysfunction type    2. Epididymitis        Plan:       Erectile dysfunction, unspecified erectile dysfunction type  -     sildenafil (VIAGRA) 100 MG tablet; Take 1 tablet (100 mg total) by mouth daily as needed for Erectile Dysfunction.  Dispense: 6 tablet; Refill:  0    Epididymitis    Epididymitis resolved at this time, no further treatment required at this time. Will trial Viagra 25mg and then 50mg and then 100mg if tolerated since Cialis isn't working well. Sent to [facility] pharmacy. Will need repeat colonoscopy next year, counseled patient. Discussed prostate screening, deferred at this time.     Follow-up in about 5 months (around 6/3/2019).

## 2019-01-14 ENCOUNTER — TELEPHONE (OUTPATIENT)
Dept: FAMILY MEDICINE | Facility: HOSPITAL | Age: 59
End: 2019-01-14

## 2019-01-14 NOTE — TELEPHONE ENCOUNTER
----- Message from Bernie Barrett sent at 1/14/2019  2:32 PM CST -----  Patients last prescription is giving him trouble and would like to talk to Dr about it sildenafil (VIAGRA) 100 MG tablet

## 2019-01-16 ENCOUNTER — DOCUMENTATION ONLY (OUTPATIENT)
Dept: FAMILY MEDICINE | Facility: HOSPITAL | Age: 59
End: 2019-01-16

## 2019-01-16 NOTE — PROGRESS NOTES
Attempted to call patient regarding medication, no response.    1/16/2019 1:03 PM Kendell Toscano M.D.

## 2019-01-30 ENCOUNTER — TELEPHONE (OUTPATIENT)
Dept: FAMILY MEDICINE | Facility: HOSPITAL | Age: 59
End: 2019-01-30

## 2019-01-30 NOTE — TELEPHONE ENCOUNTER
----- Message from Sangeetha Wilson MA sent at 1/29/2019  4:14 PM CST -----  Patient had a missed call; please call again.  Thanks.

## 2019-02-01 ENCOUNTER — DOCUMENTATION ONLY (OUTPATIENT)
Dept: FAMILY MEDICINE | Facility: HOSPITAL | Age: 59
End: 2019-02-01

## 2019-02-01 DIAGNOSIS — N45.1 EPIDIDYMITIS: Primary | ICD-10-CM

## 2019-02-01 NOTE — PROGRESS NOTES
Returned call to patient. Patient had return of symptoms, previously treated for epididymitis. Will send in labs and order ultrasound and discuss at his appointment on 02/18. Orders placed. All questions answered.    2/1/2019 11:09 AM Kendell Toscano M.D.

## 2019-02-07 ENCOUNTER — HOSPITAL ENCOUNTER (OUTPATIENT)
Dept: RADIOLOGY | Facility: HOSPITAL | Age: 59
Discharge: HOME | End: 2019-02-07
Attending: FAMILY MEDICINE
Payer: COMMERCIAL

## 2019-02-07 DIAGNOSIS — N45.1 EPIDIDYMITIS: ICD-10-CM

## 2019-02-07 PROCEDURE — 76870 US EXAM SCROTUM: CPT | Mod: TC

## 2019-02-07 PROCEDURE — 76870 US SCROTUM AND TESTICLES: ICD-10-PCS | Mod: 26,,, | Performed by: RADIOLOGY

## 2019-02-07 PROCEDURE — 76870 US EXAM SCROTUM: CPT | Mod: 26,,, | Performed by: RADIOLOGY

## 2019-02-18 ENCOUNTER — OFFICE VISIT (OUTPATIENT)
Dept: FAMILY MEDICINE | Facility: HOSPITAL | Age: 59
End: 2019-02-18
Attending: SPECIALIST
Payer: COMMERCIAL

## 2019-02-18 VITALS
HEART RATE: 54 BPM | WEIGHT: 192.69 LBS | BODY MASS INDEX: 26.1 KG/M2 | HEIGHT: 72 IN | DIASTOLIC BLOOD PRESSURE: 76 MMHG | SYSTOLIC BLOOD PRESSURE: 128 MMHG

## 2019-02-18 DIAGNOSIS — N45.1 EPIDIDYMITIS: ICD-10-CM

## 2019-02-18 DIAGNOSIS — Z83.3 FAMILY HISTORY OF DIABETES MELLITUS TYPE II: ICD-10-CM

## 2019-02-18 DIAGNOSIS — R06.09 DOE (DYSPNEA ON EXERTION): ICD-10-CM

## 2019-02-18 DIAGNOSIS — N52.9 ERECTILE DYSFUNCTION, UNSPECIFIED ERECTILE DYSFUNCTION TYPE: ICD-10-CM

## 2019-02-18 DIAGNOSIS — N50.812 TESTICULAR PAIN, LEFT: Primary | ICD-10-CM

## 2019-02-18 PROCEDURE — 99213 OFFICE O/P EST LOW 20 MIN: CPT | Performed by: FAMILY MEDICINE

## 2019-02-18 NOTE — PROGRESS NOTES
Subjective:       Patient ID: Aureliano Wilson is a 59 y.o. male.    Chief Complaint: Testicle Pain (follow-up)    Mr. Wilson is a 58 year old male who presents for epididymitis follow-up.     Epididymitis- pain has improved. Patient reports continued sensitivity in his left testicle. Lasts for seconds. States it's interfering with his sexual activity. Denies painful ejaculation, denies blood in ejaculate.    ED- took viagra once, helped. Interested in more viagra. Patient with history of hypercholesterolemia and MIRELES. States he has to take a break when walking up a flight of stairs. Smokes 1/4 PPD, 7.5 pack years. Hasn't had a stress test. Denies family history of heart disease.     Denies alcohol history      Review of Systems   Constitutional: Negative for chills and fever.   HENT: Negative for congestion and sore throat.        Objective:      Vitals:    02/18/19 0909   BP: 128/76   Pulse: (!) 54     Physical Exam   Constitutional: He is oriented to person, place, and time. He appears well-developed and well-nourished. No distress.   HENT:   Head: Normocephalic and atraumatic.   Right Ear: External ear normal.   Left Ear: External ear normal.   Mouth/Throat: No oropharyngeal exudate.   Eyes: Conjunctivae and EOM are normal. Right eye exhibits no discharge. Left eye exhibits no discharge.   Neck: Normal range of motion. Neck supple. No tracheal deviation present.   Cardiovascular: Normal rate, regular rhythm and normal heart sounds. Exam reveals no gallop and no friction rub.   No murmur heard.  Abdominal: Soft. He exhibits no distension. There is no tenderness.   Musculoskeletal: He exhibits no edema or deformity.   Neurological: He is alert and oriented to person, place, and time.   Skin: Skin is warm and dry. He is not diaphoretic.   Psychiatric: He has a normal mood and affect. His behavior is normal.   Nursing note and vitals reviewed.      Assessment:       1. Testicular pain, left    2. Epididymitis    3.  Erectile dysfunction, unspecified erectile dysfunction type    4. MIRELES (dyspnea on exertion)    5. Family history of diabetes mellitus type II        Plan:       Testicular pain, left  -     Ambulatory referral to Urology    Epididymitis    Erectile dysfunction, unspecified erectile dysfunction type  -     Treadmill Stress Test (CUPID ONLY); Future  -     Hemoglobin A1c; Future; Expected date: 02/18/2019    MIRELES (dyspnea on exertion)  -     Treadmill Stress Test (CUPID ONLY); Future  -     Hemoglobin A1c; Future; Expected date: 02/18/2019    Family history of diabetes mellitus type II  -     Hemoglobin A1c; Future; Expected date: 02/18/2019    Will refer to urology for testicular sensitivity, unknown cause and patient requesting options. No signs of cancer. All questions answered.     Follow-up in about 6 months (around 8/18/2019).

## 2019-06-17 ENCOUNTER — HOSPITAL ENCOUNTER (OUTPATIENT)
Dept: CARDIOLOGY | Facility: HOSPITAL | Age: 59
Discharge: HOME | End: 2019-06-17
Attending: FAMILY MEDICINE
Payer: COMMERCIAL

## 2019-06-17 LAB
CV STRESS BASE HR: 76 BPM
DIASTOLIC BLOOD PRESSURE: 68 MMHG
OHS CV CPX 1 MINUTE RECOVERY HEART RATE: 112 BPM
OHS CV CPX 85 PERCENT MAX PREDICTED HEART RATE MALE: 136
OHS CV CPX ESTIMATED METS: 5
OHS CV CPX MAX PREDICTED HEART RATE: 160
OHS CV CPX PATIENT IS FEMALE: 0
OHS CV CPX PATIENT IS MALE: 1
OHS CV CPX PEAK DIASTOLIC BLOOD PRESSURE: 80 MMHG
OHS CV CPX PEAK HEAR RATE: 125 BPM
OHS CV CPX PEAK RATE PRESSURE PRODUCT: NORMAL
OHS CV CPX PEAK SYSTOLIC BLOOD PRESSURE: 155 MMHG
OHS CV CPX PERCENT MAX PREDICTED HEART RATE ACHIEVED: 78
OHS CV CPX RATE PRESSURE PRODUCT PRESENTING: 9652
STRESS ANGINA INDEX: 2
STRESS ECHO POST EXERCISE DUR MIN: 3 MINUTES
STRESS ECHO POST EXERCISE DUR SEC: 29 SECONDS
STRESS ECHO TARGET HR: 136 BPM
SYSTOLIC BLOOD PRESSURE: 127 MMHG

## 2019-06-17 PROCEDURE — 93018 CV STRESS TEST I&R ONLY: CPT | Mod: ,,, | Performed by: STUDENT IN AN ORGANIZED HEALTH CARE EDUCATION/TRAINING PROGRAM

## 2019-06-17 PROCEDURE — 93017 CV STRESS TEST TRACING ONLY: CPT

## 2019-06-17 PROCEDURE — 93018 TREADMILL STRESS TEST (CUPID ONLY): ICD-10-PCS | Mod: ,,, | Performed by: STUDENT IN AN ORGANIZED HEALTH CARE EDUCATION/TRAINING PROGRAM

## 2019-06-17 PROCEDURE — 93016 CV STRESS TEST SUPVJ ONLY: CPT | Mod: ,,, | Performed by: STUDENT IN AN ORGANIZED HEALTH CARE EDUCATION/TRAINING PROGRAM

## 2019-06-17 PROCEDURE — 93016 TREADMILL STRESS TEST (CUPID ONLY): ICD-10-PCS | Mod: ,,, | Performed by: STUDENT IN AN ORGANIZED HEALTH CARE EDUCATION/TRAINING PROGRAM

## 2019-06-20 ENCOUNTER — DOCUMENTATION ONLY (OUTPATIENT)
Dept: FAMILY MEDICINE | Facility: HOSPITAL | Age: 59
End: 2019-06-20

## 2019-06-20 DIAGNOSIS — R94.39 ABNORMAL STRESS TEST: Primary | ICD-10-CM

## 2019-06-20 NOTE — PROGRESS NOTES
Attempted to call patient, no answer. Will refer to cardiology for abnormal cardiac stress test (non-sustained vtach, bigeminy) for further evaluation.    6/20/2019 4:44 PM Kendell Toscano M.D.

## 2019-06-21 ENCOUNTER — TELEPHONE (OUTPATIENT)
Dept: FAMILY MEDICINE | Facility: HOSPITAL | Age: 59
End: 2019-06-21

## 2019-06-21 NOTE — TELEPHONE ENCOUNTER
----- Message from Sangeetha Wilson MA sent at 6/21/2019  8:36 AM CDT -----  Patient requests a return call to number above with results of stress test done downstairs. Thanks.

## 2019-06-26 ENCOUNTER — OFFICE VISIT (OUTPATIENT)
Dept: CARDIOLOGY | Facility: CLINIC | Age: 59
End: 2019-06-26
Payer: COMMERCIAL

## 2019-06-26 VITALS — OXYGEN SATURATION: 96 % | SYSTOLIC BLOOD PRESSURE: 124 MMHG | HEART RATE: 77 BPM | DIASTOLIC BLOOD PRESSURE: 77 MMHG

## 2019-06-26 DIAGNOSIS — R94.39 ABNORMAL STRESS TEST: ICD-10-CM

## 2019-06-26 DIAGNOSIS — R06.09 DYSPNEA ON EXERTION: ICD-10-CM

## 2019-06-26 DIAGNOSIS — I49.3 FREQUENT PVCS: ICD-10-CM

## 2019-06-26 PROCEDURE — 99999 PR PBB SHADOW E&M-EST. PATIENT-LVL II: CPT | Mod: PBBFAC,,, | Performed by: INTERNAL MEDICINE

## 2019-06-26 PROCEDURE — 99999 PR PBB SHADOW E&M-EST. PATIENT-LVL II: ICD-10-PCS | Mod: PBBFAC,,, | Performed by: INTERNAL MEDICINE

## 2019-06-26 PROCEDURE — 99204 PR OFFICE/OUTPT VISIT, NEW, LEVL IV, 45-59 MIN: ICD-10-PCS | Mod: S$GLB,,, | Performed by: INTERNAL MEDICINE

## 2019-06-26 PROCEDURE — 99204 OFFICE O/P NEW MOD 45 MIN: CPT | Mod: S$GLB,,, | Performed by: INTERNAL MEDICINE

## 2019-06-26 NOTE — PROGRESS NOTES
Subjective:   Patient ID:  Aureliano Wilson is a 59 y.o. male who presents for evaluation of No chief complaint on file.      HPI: 60 y/o male with PMH of smoking present to establish care secondary to abnormal stress test showing frequent PVCs and Arrhythmias. He had stress secondary to dyspnea but no chest pain. No known family history of CAD.   No DM  No activity, very sedentary as per patient    He has been having dyspnea for 1 year. He was requesting viagra and wife was concerned.       Past Medical History:   Diagnosis Date    Erectile dysfunction        Past Surgical History:   Procedure Laterality Date    COLONOSCOPY N/A 6/5/2017    Performed by Tung Cochran MD at [facility]       Social History     Tobacco Use    Smoking status: Current Every Day Smoker     Packs/day: 0.25     Years: 30.00     Pack years: 7.50    Tobacco comment: 6 cigarettes a day   Substance Use Topics    Alcohol use: Yes     Alcohol/week: 1.2 oz     Types: 2 Glasses of wine per week     Frequency: 2-3 times a week     Drinks per session: 1 or 2     Comment: socially    Drug use: No       Family History   Problem Relation Age of Onset    Diabetes Father     Stroke Father     Diabetes Mother        Patient's Medications   New Prescriptions    No medications on file   Previous Medications    CIALIS 20 MG TAB    TAKE 1 TABLET(20 MG) BY MOUTH EVERY 72 HOURS AS NEEDED    SILDENAFIL (VIAGRA) 100 MG TABLET    Take 1 tablet (100 mg total) by mouth daily as needed for Erectile Dysfunction.   Modified Medications    No medications on file   Discontinued Medications    No medications on file        Review of patient's allergies indicates:  No Known Allergies    Review of Systems   Constitution: Negative.   HENT: Negative.    Eyes: Negative.    Cardiovascular: Positive for dyspnea on exertion.   Respiratory: Negative.    Endocrine: Negative.    Hematologic/Lymphatic: Negative.    Skin: Negative.    Musculoskeletal: Negative.     Gastrointestinal: Negative.    Neurological: Negative.    Psychiatric/Behavioral: Negative.    Allergic/Immunologic: Negative.      Objective:   Physical Exam   Constitutional: He is oriented to person, place, and time. He appears well-developed and well-nourished. No distress.   Examination of the digits showed no clubbing or cyanosis   HENT:   Head: Normocephalic and atraumatic.   Eyes: Pupils are equal, round, and reactive to light. Conjunctivae are normal. Right eye exhibits no discharge.   Neck: Normal range of motion. Neck supple. No JVD present. No thyromegaly present.   No carotid bruits   Cardiovascular: Normal rate, regular rhythm, S1 normal, S2 normal, normal heart sounds, intact distal pulses and normal pulses. PMI is not displaced. Exam reveals no gallop, no friction rub and no opening snap.   No murmur heard.  Pulmonary/Chest: Effort normal and breath sounds normal. No respiratory distress. He has no wheezes. He has no rales. He exhibits no tenderness.   Abdominal: Soft. Bowel sounds are normal. He exhibits no distension and no mass. There is no tenderness. There is no guarding.   No hepatosplenomegaly   Musculoskeletal: Normal range of motion. He exhibits no edema or tenderness.   Lymphadenopathy:     He has no cervical adenopathy.   Neurological: He is alert and oriented to person, place, and time.   Skin: Skin is warm. No rash noted. He is not diaphoretic. No erythema.   Psychiatric: He has a normal mood and affect.   Nursing note and vitals reviewed.      Lab Results   Component Value Date    WBC 6.94 02/28/2018    HGB 13.7 (L) 02/28/2018    HCT 40.9 02/28/2018    MCV 86 02/28/2018     02/28/2018         Chemistry        Component Value Date/Time     02/28/2018 1714    K 4.3 02/28/2018 1714     02/28/2018 1714    CO2 26 02/28/2018 1714    BUN 12 02/28/2018 1714    CREATININE 1.0 02/28/2018 1714    GLU 88 02/28/2018 1714        Component Value Date/Time    CALCIUM 9.1 02/28/2018  1714    ALKPHOS 56 02/28/2018 1714    AST 18 02/28/2018 1714    ALT 18 02/28/2018 1714    BILITOT 0.7 02/28/2018 1714    ESTGFRAFRICA >60 02/28/2018 1714    EGFRNONAA >60 02/28/2018 1714            Lab Results   Component Value Date    CHOL 217 (H) 03/16/2017    CHOL 198 11/04/2015    CHOL 217 (H) 09/06/2007     Lab Results   Component Value Date    HDL 45 03/16/2017    HDL 40 11/04/2015    HDL 44 09/06/2007     Lab Results   Component Value Date    LDLCALC 149.8 03/16/2017    LDLCALC 133.6 11/04/2015    LDLCALC 154.4 (H) 09/06/2007     Lab Results   Component Value Date    TRIG 111 03/16/2017    TRIG 122 11/04/2015    TRIG 93 09/06/2007     Lab Results   Component Value Date    CHOLHDL 20.7 03/16/2017    CHOLHDL 20.2 11/04/2015    CHOLHDL 20.3 09/06/2007       Lab Results   Component Value Date    TSH 1.426 03/16/2017       No results found for: HGBA1C    ECGs reviewed  LABS reviewed  Stress test    Arrhythmias during stress: non-sustained VT, venticular bigemny and ventricular couplets    The patient reported symptomatic Shortness of breath during the stress test after 3 minutes of ambulation    The EKG portion of this study is abnormal but not diagnostic.    Assessment:     1. Abnormal stress test    2. Frequent PVCs    3. Dyspnea on exertion        Plan:     Diagnoses and all orders for this visit:    Abnormal stress test  -     NM Myocardial Perfusion Spect Multi Pharmacologic; Future; Expected date: 06/26/2019  -     Treadmill Stress Test; Future    Frequent PVCs  -     Treadmill Stress Test; Future  -     Transthoracic Echo (TTE) Complete 2D; Future    Dyspnea on exertion  -     NM Myocardial Perfusion Spect Multi Pharmacologic; Future; Expected date: 06/26/2019  -     Treadmill Stress Test; Future        Orders Placed This Encounter   Procedures    NM Myocardial Perfusion Spect Multi Pharmacologic     Standing Status:   Future     Standing Expiration Date:   6/26/2020     Order Specific Question:   May  the Radiologist modify the order per protocol to meet the clinical needs of the patient?     Answer:   Yes     Order Specific Question:   Stress Medication to use:     Answer:   Regadenoson     Order Specific Question:   Diabetes?     Answer:   No     Order Specific Question:   Will a Cardiologist read this study?     Answer:   No    Treadmill Stress Test     Standing Status:   Future     Standing Expiration Date:   6/26/2020     Order Specific Question:   Which stress agent will be used     Answer:   Pharm     Order Specific Question:   Which medicaton for the stress procedure?     Answer:   Regadenoson    Transthoracic Echo (TTE) Complete 2D     Standing Status:   Future     Standing Expiration Date:   6/26/2020       No Vtach seen on my review of strips. There is frequent PVCs with couplets and bigeminy  Will get Nuclear stress  2d echo complete  Call with results  Smoking cessation and exercise encouraged

## 2019-06-26 NOTE — LETTER
June 26, 2019      Kendell Toscano MD  [address]  [address]  [place] [address]           [facility]  [address], [address]  [place] [address]  Phone: [phone]          Patient: Aureliano Wilson   MR Number: [id number]   YOB: 1960   Date of Visit: 6/26/2019       Dear Dr. Kendell Toscano:    Thank you for referring Aureliano Wilson to me for evaluation. Attached you will find relevant portions of my assessment and plan of care.    If you have questions, please do not hesitate to call me. I look forward to following Aureliano Wilson along with you.    Sincerely,    Rashawn Weaver MD    Enclosure  CC:  No Recipients    If you would like to receive this communication electronically, please contact [email or web address] or [phone] to request more information on [employer] Link access.    For providers and/or their staff who would like to refer a patient to [facility], please contact us through our one-stop-shop provider referral line, [facility], at [phone].    If you feel you have received this communication in error or would no longer like to receive these types of communications, please e-mail [email or web address]

## 2019-07-15 ENCOUNTER — HOSPITAL ENCOUNTER (OUTPATIENT)
Dept: CARDIOLOGY | Facility: HOSPITAL | Age: 59
Discharge: HOME | End: 2019-07-15
Attending: INTERNAL MEDICINE
Payer: COMMERCIAL

## 2019-07-15 ENCOUNTER — HOSPITAL ENCOUNTER (OUTPATIENT)
Dept: RADIOLOGY | Facility: HOSPITAL | Age: 59
Discharge: HOME | End: 2019-07-15
Attending: INTERNAL MEDICINE
Payer: COMMERCIAL

## 2019-07-15 DIAGNOSIS — R06.09 DYSPNEA ON EXERTION: ICD-10-CM

## 2019-07-15 DIAGNOSIS — I49.3 FREQUENT PVCS: ICD-10-CM

## 2019-07-15 DIAGNOSIS — R94.39 ABNORMAL STRESS TEST: ICD-10-CM

## 2019-07-15 LAB
AORTIC ROOT ANNULUS: 3.16 CM
AORTIC VALVE CUSP SEPERATION: 1.98 CM
AV INDEX (PROSTH): 1.01
AV MEAN GRADIENT: 4 MMHG
AV PEAK GRADIENT: 7 MMHG
AV VALVE AREA: 3.72 CM2
AV VELOCITY RATIO: 0.76
CV ECHO LV RWT: 0.34 CM
DOP CALC AO PEAK VEL: 1.36 M/S
DOP CALC AO VTI: 25.45 CM
DOP CALC LVOT AREA: 3.7 CM2
DOP CALC LVOT DIAMETER: 2.17 CM
DOP CALC LVOT PEAK VEL: 1.03 M/S
DOP CALC LVOT STROKE VOLUME: 94.59 CM3
DOP CALCLVOT PEAK VEL VTI: 25.59 CM
E WAVE DECELERATION TIME: 165.44 MSEC
E/A RATIO: 1.11
ECHO LV POSTERIOR WALL: 0.88 CM (ref 0.6–1.1)
FRACTIONAL SHORTENING: 28 % (ref 28–44)
INTERVENTRICULAR SEPTUM: 0.85 CM (ref 0.6–1.1)
IVRT: 0.08 MSEC
LEFT ATRIUM SIZE: 2.8 CM
LEFT INTERNAL DIMENSION IN SYSTOLE: 3.78 CM (ref 2.1–4)
LEFT VENTRICLE DIASTOLIC VOLUME: 132.34 ML
LEFT VENTRICLE SYSTOLIC VOLUME: 61.29 ML
LEFT VENTRICULAR INTERNAL DIMENSION IN DIASTOLE: 5.25 CM (ref 3.5–6)
LEFT VENTRICULAR MASS: 163.14 G
MV PEAK A VEL: 0.61 M/S
MV PEAK E VEL: 0.68 M/S
PISA TR MAX VEL: 1.68 M/S
PULM VEIN S/D RATIO: 1.04
PV PEAK D VEL: 0.55 M/S
PV PEAK S VEL: 0.57 M/S
PV PEAK VELOCITY: 0.98 CM/S
RA PRESSURE: 3 MMHG
RIGHT VENTRICULAR END-DIASTOLIC DIMENSION: 2.45 CM
TR MAX PG: 11 MMHG
TV REST PULMONARY ARTERY PRESSURE: 14 MMHG

## 2019-07-15 PROCEDURE — 93306 TTE W/DOPPLER COMPLETE: CPT

## 2019-07-15 PROCEDURE — 93306 TTE W/DOPPLER COMPLETE: CPT | Mod: 26,,, | Performed by: INTERNAL MEDICINE

## 2019-07-15 PROCEDURE — 93306 TRANSTHORACIC ECHO (TTE) COMPLETE (CUPID ONLY): ICD-10-PCS | Mod: 26,,, | Performed by: INTERNAL MEDICINE

## 2019-10-17 ENCOUNTER — TELEPHONE (OUTPATIENT)
Dept: FAMILY MEDICINE | Facility: HOSPITAL | Age: 59
End: 2019-10-17

## 2019-10-17 NOTE — TELEPHONE ENCOUNTER
----- Message from Sangeetha Wilson MA sent at 10/16/2019 10:23 AM CDT -----  Patient requests return call to discuss the mri he was unable to do.  Please call patient at number above.  Thanks.

## 2019-10-17 NOTE — NURSING
Call patient regarding MRI results. After further discussion patient did not have test done. Patient would like to have results of  Stress test. Explained to patient that  will be out of the office until October 30th.Patient states that he will wait until Dr Walton gets back. Message sent to Dr Walton.

## 2019-11-14 ENCOUNTER — TELEPHONE (OUTPATIENT)
Dept: FAMILY MEDICINE | Facility: HOSPITAL | Age: 59
End: 2019-11-14

## 2019-11-14 NOTE — TELEPHONE ENCOUNTER
----- Message from Sangeetha Wilson MA sent at 11/13/2019 12:56 PM CST -----  Patient requests a return call to number above to discuss testing that was done in July. Patient states he left prior message. Thanks.

## 2020-09-22 ENCOUNTER — OFFICE VISIT (OUTPATIENT)
Dept: FAMILY MEDICINE | Facility: HOSPITAL | Age: 60
End: 2020-09-22
Payer: COMMERCIAL

## 2020-09-22 VITALS
SYSTOLIC BLOOD PRESSURE: 118 MMHG | HEIGHT: 72 IN | WEIGHT: 162.25 LBS | BODY MASS INDEX: 21.98 KG/M2 | DIASTOLIC BLOOD PRESSURE: 71 MMHG | HEART RATE: 73 BPM

## 2020-09-22 DIAGNOSIS — K40.90 RIGHT INGUINAL HERNIA: Primary | ICD-10-CM

## 2020-09-22 PROCEDURE — 99213 OFFICE O/P EST LOW 20 MIN: CPT | Performed by: STUDENT IN AN ORGANIZED HEALTH CARE EDUCATION/TRAINING PROGRAM

## 2020-09-23 NOTE — PROGRESS NOTES
Subjective:       Patient ID: Aureliano Wilson is a 60 y.o. male.    Chief Complaint: Hernia (right)    Patient presenting for concerns of right inguinal hernia that has been present for several months. Patient was concern as it was a significant bulge a few days ago. Patient states that he did not try to push it back but it resolved on its own. Patient denies any changes in bowel habits. Patient denies any pain.     Review of Systems   Constitutional: Negative for activity change, chills, fatigue and fever.   HENT: Negative for rhinorrhea and sore throat.    Respiratory: Negative for cough, choking, shortness of breath and wheezing.    Cardiovascular: Negative for chest pain, palpitations and leg swelling.   Gastrointestinal: Negative for abdominal pain, constipation, diarrhea, nausea and vomiting.   Endocrine: Negative for polydipsia, polyphagia and polyuria.   Genitourinary: Negative for difficulty urinating, frequency, hematuria and urgency.   Musculoskeletal: Negative for back pain and myalgias.   Skin: Negative for color change and rash.   Neurological: Negative for dizziness, weakness, light-headedness and headaches.       Objective:      Vitals:    09/22/20 1505   BP: 118/71   Pulse: 73     Physical Exam  Constitutional:       Appearance: Normal appearance.   HENT:      Head: Normocephalic and atraumatic.      Mouth/Throat:      Mouth: Mucous membranes are moist.   Neck:      Musculoskeletal: Normal range of motion.   Cardiovascular:      Rate and Rhythm: Normal rate and regular rhythm.   Pulmonary:      Effort: Pulmonary effort is normal.      Breath sounds: Normal breath sounds.   Abdominal:      General: Abdomen is flat.   Genitourinary:     Comments: Reducible inguinal hernia  Musculoskeletal: Normal range of motion.   Skin:     General: Skin is warm.   Neurological:      General: No focal deficit present.      Mental Status: He is alert.         Assessment:       1. Right inguinal hernia        Plan:        Right inguinal hernia  -     Ambulatory referral/consult to General Surgery; Future; Expected date: 09/29/2020      Follow up in about 6 months (around 3/22/2021). Discussed risks of an incarcerated hernia and strict ED precautions. Will send patient for referral to Surgery for evaluation for surgery

## 2020-09-24 ENCOUNTER — OFFICE VISIT (OUTPATIENT)
Dept: SURGERY | Facility: CLINIC | Age: 60
End: 2020-09-24
Payer: COMMERCIAL

## 2020-09-24 VITALS
HEIGHT: 72 IN | WEIGHT: 162.81 LBS | SYSTOLIC BLOOD PRESSURE: 112 MMHG | BODY MASS INDEX: 22.05 KG/M2 | DIASTOLIC BLOOD PRESSURE: 75 MMHG | HEART RATE: 80 BPM

## 2020-09-24 DIAGNOSIS — K40.90 RIGHT INGUINAL HERNIA: ICD-10-CM

## 2020-09-24 PROCEDURE — 3008F PR BODY MASS INDEX (BMI) DOCUMENTED: ICD-10-PCS | Mod: CPTII,S$GLB,, | Performed by: SURGERY

## 2020-09-24 PROCEDURE — 99999 PR PBB SHADOW E&M-EST. PATIENT-LVL III: CPT | Mod: PBBFAC,,, | Performed by: SURGERY

## 2020-09-24 PROCEDURE — 99203 PR OFFICE/OUTPT VISIT, NEW, LEVL III, 30-44 MIN: ICD-10-PCS | Mod: S$GLB,,, | Performed by: SURGERY

## 2020-09-24 PROCEDURE — 3008F BODY MASS INDEX DOCD: CPT | Mod: CPTII,S$GLB,, | Performed by: SURGERY

## 2020-09-24 PROCEDURE — 99999 PR PBB SHADOW E&M-EST. PATIENT-LVL III: ICD-10-PCS | Mod: PBBFAC,,, | Performed by: SURGERY

## 2020-09-24 PROCEDURE — 99203 OFFICE O/P NEW LOW 30 MIN: CPT | Mod: S$GLB,,, | Performed by: SURGERY

## 2020-09-24 NOTE — H&P
[facility]  OUTPATIENT H&P    REASON FOR VISIT/CC:  Right inguinal hernia    HPI: Aureliano Wilson is a 60 y.o. male who presents for evaluation of suspected right inguinal hernia.  Patient reports approximately 1 month history of a fluctuating bulge in the right groin with occasional discomfort and pain.  Symptoms seem to be worsened with prolonged standing or activity.  Pain is difficult to characterized.  Patient reports the bulge is usually soft and he can push it back inward.  Denies overlying skin changes.  No symptoms on the left side.  Never had previous surgery in the area.  No issues with straining to have a bowel movement or urination, no dysuria, no constipation or diarrhea.    I have reviewed the patient's chart including prior progress notes, procedures and testing.     ROS:   Review of Systems   Constitutional: Negative for activity change, chills and fever.   Respiratory: Positive for shortness of breath (Occasional with mild-to-moderate exertion). Negative for apnea, cough, chest tightness and wheezing.    Cardiovascular: Negative for chest pain, palpitations and leg swelling.   Gastrointestinal: Negative for abdominal distention, abdominal pain, nausea and vomiting.   Genitourinary: Negative for difficulty urinating, dysuria and hematuria.   Musculoskeletal: Negative for arthralgias, neck pain and neck stiffness.   Skin: Negative for color change, rash and wound.   Neurological: Negative for dizziness, syncope and light-headedness.   Psychiatric/Behavioral: Negative for agitation, behavioral problems and confusion.       PROBLEM LIST:  Patient Active Problem List   Diagnosis    Erectile dysfunction    Screening for colon cancer    Polyp of colon    Epididymitis    Abnormal stress test    Frequent PVCs         HISTORY  Past Medical History:   Diagnosis Date    Erectile dysfunction        Past Surgical History:   Procedure Laterality Date    COLONOSCOPY N/A 6/5/2017    Procedure:  COLONOSCOPY;  Surgeon: Tung Cochran MD;  Location: [facility];  Service: Endoscopy;  Laterality: N/A;       Social History     Tobacco Use    Smoking status: Current Every Day Smoker     Packs/day: 0.25     Years: 30.00     Pack years: 7.50    Tobacco comment: 6 cigarettes a day   Substance Use Topics    Alcohol use: Yes     Alcohol/week: 2.0 standard drinks     Types: 2 Glasses of wine per week     Frequency: 2-3 times a week     Drinks per session: 1 or 2     Comment: socially    Drug use: No       Family History   Problem Relation Age of Onset    Diabetes Father     Stroke Father     Diabetes Mother          MEDS:  Current Outpatient Medications on File Prior to Visit   Medication Sig Dispense Refill    CIALIS 20 mg Tab TAKE 1 TABLET(20 MG) BY MOUTH EVERY 72 HOURS AS NEEDED (Patient not taking: Reported on 9/22/2020) 10 tablet 0    sildenafil (VIAGRA) 100 MG tablet Take 1 tablet (100 mg total) by mouth daily as needed for Erectile Dysfunction. (Patient not taking: Reported on 9/22/2020) 6 tablet 0     No current facility-administered medications on file prior to visit.        ALLERGIES:  Review of patient's allergies indicates:  No Known Allergies      VITALS:  Vitals:    09/24/20 1554   BP: 112/75   Pulse: 80         PHYSICAL EXAM:  Physical Exam  Vitals signs reviewed.   Constitutional:       Appearance: Normal appearance. He is normal weight.   HENT:      Head: Normocephalic and atraumatic.   Eyes:      General: No scleral icterus.  Neck:      Musculoskeletal: Normal range of motion. No neck rigidity.   Cardiovascular:      Rate and Rhythm: Normal rate and regular rhythm.      Pulses: Normal pulses.   Pulmonary:      Effort: Pulmonary effort is normal. No respiratory distress.      Breath sounds: Normal breath sounds.   Abdominal:      General: There is no distension.      Palpations: Abdomen is soft. There is no mass (Reducible right inguinal hernia).      Tenderness: There is no abdominal  tenderness.      Hernia: A hernia (Reducible small to moderate-sized right inguinal hernia with no significant overlying skin changes; pressure felt on Valsalva on the left side but no obvious hernia) is present.   Musculoskeletal: Normal range of motion.         General: No swelling or tenderness.   Neurological:      General: No focal deficit present.      Mental Status: He is alert and oriented to person, place, and time.      Motor: No weakness.   Psychiatric:         Mood and Affect: Mood normal.         Behavior: Behavior normal.         Thought Content: Thought content normal.         Judgment: Judgment normal.           LABS:  Lab Results   Component Value Date    WBC 6.94 02/28/2018    RBC 4.78 02/28/2018    HGB 13.7 (L) 02/28/2018    HCT 40.9 02/28/2018     02/28/2018     Lab Results   Component Value Date    GLU 88 02/28/2018     02/28/2018    K 4.3 02/28/2018     02/28/2018    CO2 26 02/28/2018    BUN 12 02/28/2018    CREATININE 1.0 02/28/2018    CALCIUM 9.1 02/28/2018     Lab Results   Component Value Date    ALT 18 02/28/2018    AST 18 02/28/2018    ALKPHOS 56 02/28/2018    BILITOT 0.7 02/28/2018     No results found for: MG, PHOS    STUDIES:  Echo images and reports were personally reviewed.  Echocardiogram 07/15/2019  · Left ventricular systolic function. The estimated ejection fraction is 55%  · Normal LV diastolic function.  · Normal right ventricular systolic function.  · Normal central venous pressure (3 mm Hg).  · The estimated PA systolic pressure is 14 mm Hg       ASSESSMENT & PLAN:  60 y.o. male with reducible right inguinal hernia, questionable left inguinal hernia    - diagnosis of inguinal hernia were discussed with the patient and his wife who was on the phone as well as potential treatment options which include observation versus surgical intervention, patient wishes to proceed surgical intervention to prevent worsening symptoms  - open and minimally invasive  approaches were also discussed including the potential risk and benefits as well as the preferred use of mesh and repair  - patient's wife expressed concerns about utilizing mesh stating it is unnatural for a foreign body to be placed in the tissue, she also had concerns with a robotic approach due to the reproduce testing of instruments; these concerns were addressed and further information including the use of print outs were provided to the patient to further review  - he will contact the office with this decision to proceed with surgical intervention  - if the patient desires to avoid mesh this would obligate us to an open repair as there are no standardized techniques for mesh free minimally invasive repairs  - he is a reason a candidate for open approach without mesh given his body habitus, small size of hernia and no previous surgeries  - patient would require EKG prior to consideration for general anesthesia given previous history is a PVCs and bigeminy, he was not able to obtain a nuclear medicine myocardial perfusion study due to reaction to the contrast and claustrophobia, he did have an echo which showed good cardiac function

## 2020-09-24 NOTE — LETTER
September 24, 2020      Lizabeth Berry MD  [address]  [address]  [facility] [address]           [facility]  [address]  [place] [address]  Phone: [phone]          Patient: Aureliano Wilson   MR Number: [id number]   YOB: 1960   Date of Visit: 9/24/2020       Dear Dr. Lizabeth Berry:    Thank you for referring Aureliano Wilson to me for evaluation. Attached you will find relevant portions of my assessment and plan of care.    If you have questions, please do not hesitate to call me. I look forward to following Aureliano Wilson along with you.    Sincerely,    Arron Black Jr., MD    Enclosure  CC:  No Recipients    If you would like to receive this communication electronically, please contact [email or web address] or [phone] to request more information on [employer] access.    For providers and/or their staff who would like to refer a patient to [facility], please contact us through our one-stop-shop provider referral line, [facility] , at [phone].    If you feel you have received this communication in error or would no longer like to receive these types of communications, please e-mail [email or web address]

## 2020-09-29 NOTE — PROGRESS NOTES
I was present in clinic during the visit and assume the primary medical care of this patient.  I discussed the case with Dr. Berry, and I have reviewed and agree with the assessment and plan.

## 2020-10-06 ENCOUNTER — PATIENT MESSAGE (OUTPATIENT)
Dept: SURGERY | Facility: CLINIC | Age: 60
End: 2020-10-06

## 2020-10-17 ENCOUNTER — PATIENT MESSAGE (OUTPATIENT)
Dept: SURGERY | Facility: CLINIC | Age: 60
End: 2020-10-17

## 2020-10-20 DIAGNOSIS — Z01.818 PRE-OP EXAM: ICD-10-CM

## 2020-10-20 DIAGNOSIS — K40.90 RIGHT INGUINAL HERNIA: Primary | ICD-10-CM

## 2020-10-20 RX ORDER — SODIUM CHLORIDE 9 MG/ML
INJECTION, SOLUTION INTRAVENOUS CONTINUOUS
Status: CANCELLED | OUTPATIENT
Start: 2020-10-20

## 2020-10-23 ENCOUNTER — ANESTHESIA EVENT (OUTPATIENT)
Dept: SURGERY | Facility: HOSPITAL | Age: 60
End: 2020-10-23
Payer: COMMERCIAL

## 2020-10-27 ENCOUNTER — LAB VISIT (OUTPATIENT)
Dept: FAMILY MEDICINE | Facility: CLINIC | Age: 60
End: 2020-10-27
Payer: COMMERCIAL

## 2020-10-27 DIAGNOSIS — Z01.818 PRE-OP EXAM: ICD-10-CM

## 2020-10-27 PROCEDURE — U0003 INFECTIOUS AGENT DETECTION BY NUCLEIC ACID (DNA OR RNA); SEVERE ACUTE RESPIRATORY SYNDROME CORONAVIRUS 2 (SARS-COV-2) (CORONAVIRUS DISEASE [COVID-19]), AMPLIFIED PROBE TECHNIQUE, MAKING USE OF HIGH THROUGHPUT TECHNOLOGIES AS DESCRIBED BY CMS-2020-01-R: HCPCS

## 2020-10-28 LAB — SARS-COV-2 RNA RESP QL NAA+PROBE: NOT DETECTED

## 2020-10-29 DIAGNOSIS — K40.90 NON-RECURRENT INGUINAL HERNIA WITHOUT OBSTRUCTION OR GANGRENE, UNSPECIFIED LATERALITY: Primary | ICD-10-CM

## 2020-10-30 ENCOUNTER — ANESTHESIA (OUTPATIENT)
Dept: SURGERY | Facility: HOSPITAL | Age: 60
End: 2020-10-30
Payer: COMMERCIAL

## 2020-10-30 ENCOUNTER — HOSPITAL ENCOUNTER (OUTPATIENT)
Facility: HOSPITAL | Age: 60
Discharge: HOME | End: 2020-10-30
Attending: SURGERY | Admitting: SURGERY
Payer: COMMERCIAL

## 2020-10-30 VITALS
HEART RATE: 65 BPM | RESPIRATION RATE: 18 BRPM | BODY MASS INDEX: 22.4 KG/M2 | SYSTOLIC BLOOD PRESSURE: 120 MMHG | DIASTOLIC BLOOD PRESSURE: 64 MMHG | WEIGHT: 160 LBS | HEIGHT: 71 IN | TEMPERATURE: 98 F | OXYGEN SATURATION: 98 %

## 2020-10-30 DIAGNOSIS — Z01.818 PREOP TESTING: ICD-10-CM

## 2020-10-30 DIAGNOSIS — K40.90 RIGHT INGUINAL HERNIA: Primary | ICD-10-CM

## 2020-10-30 PROCEDURE — 63600175 PHARM REV CODE 636 W HCPCS: Performed by: SURGERY

## 2020-10-30 PROCEDURE — 63600175 PHARM REV CODE 636 W HCPCS: Performed by: STUDENT IN AN ORGANIZED HEALTH CARE EDUCATION/TRAINING PROGRAM

## 2020-10-30 PROCEDURE — 36000709 HC OR TIME LEV III EA ADD 15 MIN: Performed by: SURGERY

## 2020-10-30 PROCEDURE — C1781 MESH (IMPLANTABLE): HCPCS | Performed by: SURGERY

## 2020-10-30 PROCEDURE — 71000015 HC POSTOP RECOV 1ST HR: Performed by: SURGERY

## 2020-10-30 PROCEDURE — 71000033 HC RECOVERY, INTIAL HOUR: Performed by: SURGERY

## 2020-10-30 PROCEDURE — 25000003 PHARM REV CODE 250: Performed by: STUDENT IN AN ORGANIZED HEALTH CARE EDUCATION/TRAINING PROGRAM

## 2020-10-30 PROCEDURE — 37000009 HC ANESTHESIA EA ADD 15 MINS: Performed by: SURGERY

## 2020-10-30 PROCEDURE — 93005 ELECTROCARDIOGRAM TRACING: CPT

## 2020-10-30 PROCEDURE — 71000016 HC POSTOP RECOV ADDL HR: Performed by: SURGERY

## 2020-10-30 PROCEDURE — 37000008 HC ANESTHESIA 1ST 15 MINUTES: Performed by: SURGERY

## 2020-10-30 PROCEDURE — 36000708 HC OR TIME LEV III 1ST 15 MIN: Performed by: SURGERY

## 2020-10-30 PROCEDURE — 27201423 OPTIME MED/SURG SUP & DEVICES STERILE SUPPLY: Performed by: SURGERY

## 2020-10-30 PROCEDURE — 71000039 HC RECOVERY, EACH ADD'L HOUR: Performed by: SURGERY

## 2020-10-30 PROCEDURE — 25000003 PHARM REV CODE 250: Performed by: SURGERY

## 2020-10-30 PROCEDURE — C1727 CATH, BAL TIS DIS, NON-VAS: HCPCS | Performed by: SURGERY

## 2020-10-30 PROCEDURE — 49650 LAP ING HERNIA REPAIR INIT: CPT | Mod: RT,,, | Performed by: SURGERY

## 2020-10-30 PROCEDURE — 49650 PR LAP,INGUINAL HERNIA REPR,INITIAL: ICD-10-PCS | Mod: RT,,, | Performed by: SURGERY

## 2020-10-30 DEVICE — MESH PROGRIP LAP 10X15CM RIGHT: Type: IMPLANTABLE DEVICE | Site: ABDOMEN | Status: FUNCTIONAL

## 2020-10-30 RX ORDER — CEFAZOLIN SODIUM 2 G/50ML
2 SOLUTION INTRAVENOUS
Status: COMPLETED | OUTPATIENT
Start: 2020-10-30 | End: 2020-10-30

## 2020-10-30 RX ORDER — SODIUM CHLORIDE 9 MG/ML
INJECTION, SOLUTION INTRAVENOUS CONTINUOUS
Status: DISCONTINUED | OUTPATIENT
Start: 2020-10-30 | End: 2020-10-30 | Stop reason: HOSPADM

## 2020-10-30 RX ORDER — ACETAMINOPHEN 500 MG
1000 TABLET ORAL EVERY 8 HOURS
Refills: 0 | COMMUNITY
Start: 2020-10-30 | End: 2020-11-02

## 2020-10-30 RX ORDER — BUPIVACAINE HYDROCHLORIDE 5 MG/ML
INJECTION, SOLUTION PERINEURAL
Status: DISCONTINUED | OUTPATIENT
Start: 2020-10-30 | End: 2020-10-30 | Stop reason: HOSPADM

## 2020-10-30 RX ORDER — SODIUM CHLORIDE, SODIUM LACTATE, POTASSIUM CHLORIDE, CALCIUM CHLORIDE 600; 310; 30; 20 MG/100ML; MG/100ML; MG/100ML; MG/100ML
INJECTION, SOLUTION INTRAVENOUS CONTINUOUS PRN
Status: DISCONTINUED | OUTPATIENT
Start: 2020-10-30 | End: 2020-10-30

## 2020-10-30 RX ORDER — KETOROLAC TROMETHAMINE 30 MG/ML
INJECTION, SOLUTION INTRAMUSCULAR; INTRAVENOUS
Status: DISCONTINUED | OUTPATIENT
Start: 2020-10-30 | End: 2020-10-30

## 2020-10-30 RX ORDER — TAMSULOSIN HYDROCHLORIDE 0.4 MG/1
0.4 CAPSULE ORAL DAILY
Status: DISCONTINUED | OUTPATIENT
Start: 2020-10-31 | End: 2020-10-30

## 2020-10-30 RX ORDER — NEOSTIGMINE METHYLSULFATE 1 MG/ML
INJECTION, SOLUTION INTRAVENOUS
Status: DISCONTINUED | OUTPATIENT
Start: 2020-10-30 | End: 2020-10-30

## 2020-10-30 RX ORDER — PHENYLEPHRINE HYDROCHLORIDE 10 MG/ML
INJECTION INTRAVENOUS
Status: DISCONTINUED | OUTPATIENT
Start: 2020-10-30 | End: 2020-10-30

## 2020-10-30 RX ORDER — LIDOCAINE HYDROCHLORIDE 20 MG/ML
INJECTION INTRAVENOUS
Status: DISCONTINUED | OUTPATIENT
Start: 2020-10-30 | End: 2020-10-30

## 2020-10-30 RX ORDER — OXYCODONE HYDROCHLORIDE 5 MG/1
5 TABLET ORAL ONCE
Status: COMPLETED | OUTPATIENT
Start: 2020-10-30 | End: 2020-10-30

## 2020-10-30 RX ORDER — ONDANSETRON 2 MG/ML
4 INJECTION INTRAMUSCULAR; INTRAVENOUS DAILY PRN
Status: DISCONTINUED | OUTPATIENT
Start: 2020-10-30 | End: 2020-10-30 | Stop reason: HOSPADM

## 2020-10-30 RX ORDER — ESMOLOL HYDROCHLORIDE 10 MG/ML
INJECTION INTRAVENOUS
Status: DISCONTINUED | OUTPATIENT
Start: 2020-10-30 | End: 2020-10-30

## 2020-10-30 RX ORDER — OXYCODONE HYDROCHLORIDE 5 MG/1
5 TABLET ORAL EVERY 6 HOURS PRN
Status: COMPLETED | OUTPATIENT
Start: 2020-10-30 | End: 2020-10-30

## 2020-10-30 RX ORDER — EPHEDRINE SULFATE 50 MG/ML
INJECTION, SOLUTION INTRAVENOUS
Status: DISCONTINUED | OUTPATIENT
Start: 2020-10-30 | End: 2020-10-30

## 2020-10-30 RX ORDER — HYDROMORPHONE HYDROCHLORIDE 2 MG/ML
0.5 INJECTION, SOLUTION INTRAMUSCULAR; INTRAVENOUS; SUBCUTANEOUS EVERY 5 MIN PRN
Status: DISCONTINUED | OUTPATIENT
Start: 2020-10-30 | End: 2020-10-30 | Stop reason: HOSPADM

## 2020-10-30 RX ORDER — FENTANYL CITRATE 50 UG/ML
INJECTION, SOLUTION INTRAMUSCULAR; INTRAVENOUS
Status: DISCONTINUED | OUTPATIENT
Start: 2020-10-30 | End: 2020-10-30

## 2020-10-30 RX ORDER — PROPOFOL 10 MG/ML
VIAL (ML) INTRAVENOUS
Status: DISCONTINUED | OUTPATIENT
Start: 2020-10-30 | End: 2020-10-30

## 2020-10-30 RX ORDER — TAMSULOSIN HYDROCHLORIDE 0.4 MG/1
0.4 CAPSULE ORAL DAILY
Status: COMPLETED | OUTPATIENT
Start: 2020-10-30 | End: 2020-10-30

## 2020-10-30 RX ORDER — ACETAMINOPHEN 10 MG/ML
INJECTION, SOLUTION INTRAVENOUS
Status: DISCONTINUED | OUTPATIENT
Start: 2020-10-30 | End: 2020-10-30

## 2020-10-30 RX ORDER — ROCURONIUM BROMIDE 10 MG/ML
INJECTION, SOLUTION INTRAVENOUS
Status: DISCONTINUED | OUTPATIENT
Start: 2020-10-30 | End: 2020-10-30

## 2020-10-30 RX ORDER — IBUPROFEN 200 MG
600 TABLET ORAL EVERY 8 HOURS
Refills: 0 | COMMUNITY
Start: 2020-10-30 | End: 2020-11-02

## 2020-10-30 RX ORDER — SODIUM CHLORIDE 0.9 % (FLUSH) 0.9 %
3 SYRINGE (ML) INJECTION EVERY 6 HOURS PRN
Status: DISCONTINUED | OUTPATIENT
Start: 2020-10-30 | End: 2020-10-30 | Stop reason: HOSPADM

## 2020-10-30 RX ORDER — ONDANSETRON 2 MG/ML
INJECTION INTRAMUSCULAR; INTRAVENOUS
Status: DISCONTINUED | OUTPATIENT
Start: 2020-10-30 | End: 2020-10-30

## 2020-10-30 RX ORDER — SUCCINYLCHOLINE CHLORIDE 20 MG/ML
INJECTION INTRAMUSCULAR; INTRAVENOUS
Status: DISCONTINUED | OUTPATIENT
Start: 2020-10-30 | End: 2020-10-30

## 2020-10-30 RX ORDER — OXYCODONE HYDROCHLORIDE 5 MG/1
5 TABLET ORAL EVERY 6 HOURS PRN
Qty: 20 TABLET | Refills: 0 | Status: SHIPPED | OUTPATIENT
Start: 2020-10-30 | End: 2020-11-06

## 2020-10-30 RX ORDER — MIDAZOLAM HYDROCHLORIDE 1 MG/ML
INJECTION, SOLUTION INTRAMUSCULAR; INTRAVENOUS
Status: DISCONTINUED | OUTPATIENT
Start: 2020-10-30 | End: 2020-10-30

## 2020-10-30 RX ADMIN — TAMSULOSIN HYDROCHLORIDE 0.4 MG: 0.4 CAPSULE ORAL at 07:10

## 2020-10-30 RX ADMIN — PROPOFOL 180 MG: 10 INJECTION, EMULSION INTRAVENOUS at 01:10

## 2020-10-30 RX ADMIN — NEOSTIGMINE METHYLSULFATE 5 MG: 1 INJECTION INTRAVENOUS at 02:10

## 2020-10-30 RX ADMIN — GLYCOPYRROLATE 0.8 MG: 0.2 INJECTION, SOLUTION INTRAMUSCULAR; INTRAVITREAL at 02:10

## 2020-10-30 RX ADMIN — ROCURONIUM BROMIDE 10 MG: 10 INJECTION, SOLUTION INTRAVENOUS at 01:10

## 2020-10-30 RX ADMIN — LIDOCAINE HYDROCHLORIDE 100 MG: 20 INJECTION, SOLUTION INTRAVENOUS at 01:10

## 2020-10-30 RX ADMIN — PHENYLEPHRINE HYDROCHLORIDE 100 MCG: 10 INJECTION INTRAVENOUS at 01:10

## 2020-10-30 RX ADMIN — CEFAZOLIN SODIUM 2 G: 2 SOLUTION INTRAVENOUS at 01:10

## 2020-10-30 RX ADMIN — ROCURONIUM BROMIDE 40 MG: 10 INJECTION, SOLUTION INTRAVENOUS at 01:10

## 2020-10-30 RX ADMIN — KETOROLAC TROMETHAMINE 30 MG: 30 INJECTION, SOLUTION INTRAMUSCULAR; INTRAVENOUS at 02:10

## 2020-10-30 RX ADMIN — EPHEDRINE SULFATE 5 MG: 50 INJECTION, SOLUTION INTRAMUSCULAR; INTRAVENOUS; SUBCUTANEOUS at 01:10

## 2020-10-30 RX ADMIN — SODIUM CHLORIDE, SODIUM LACTATE, POTASSIUM CHLORIDE, AND CALCIUM CHLORIDE: .6; .31; .03; .02 INJECTION, SOLUTION INTRAVENOUS at 12:10

## 2020-10-30 RX ADMIN — HYDROMORPHONE HYDROCHLORIDE 0.5 MG: 2 INJECTION, SOLUTION INTRAMUSCULAR; INTRAVENOUS; SUBCUTANEOUS at 03:10

## 2020-10-30 RX ADMIN — ACETAMINOPHEN 1000 MG: 10 INJECTION, SOLUTION INTRAVENOUS at 01:10

## 2020-10-30 RX ADMIN — ONDANSETRON 4 MG: 2 INJECTION, SOLUTION INTRAMUSCULAR; INTRAVENOUS at 02:10

## 2020-10-30 RX ADMIN — MIDAZOLAM 2 MG: 1 INJECTION INTRAMUSCULAR; INTRAVENOUS at 12:10

## 2020-10-30 RX ADMIN — ESMOLOL HYDROCHLORIDE 80 MG: 10 INJECTION, SOLUTION INTRAVENOUS at 01:10

## 2020-10-30 RX ADMIN — SUCCINYLCHOLINE CHLORIDE 100 MG: 20 INJECTION, SOLUTION INTRAMUSCULAR; INTRAVENOUS at 01:10

## 2020-10-30 RX ADMIN — PHENYLEPHRINE HYDROCHLORIDE 100 MCG: 10 INJECTION INTRAVENOUS at 02:10

## 2020-10-30 RX ADMIN — FENTANYL CITRATE 50 MCG: 50 INJECTION, SOLUTION INTRAMUSCULAR; INTRAVENOUS at 01:10

## 2020-10-30 RX ADMIN — OXYCODONE HYDROCHLORIDE 5 MG: 5 TABLET ORAL at 04:10

## 2020-10-30 RX ADMIN — OXYCODONE HYDROCHLORIDE 5 MG: 5 TABLET ORAL at 06:10

## 2020-10-30 NOTE — PLAN OF CARE
Patient OOB but unable to urinate, bladder scan done 110 ml of urine will continue fluids and coffee to see if patient can urinate

## 2020-10-30 NOTE — OP NOTE
DATE OF PROCEDURE: 10/30/2020    PREOPERATIVE DIAGNOSIS:  Right inguinal hernia    POSTOPERATIVE DIAGNOSIS:  Right indirect inguinal hernia    PROCEDURE:  Laparoscopic TEPP right inguinal hernia repair with mesh    SURGEON: Arron Black M.D    ASSISTANT: Eve Murphy MD PGY II    ANESTHESIA:  General    ESTIMATED BLOOD LOSS:  20 cc    SPECIMEN:  None    CONDITION:  Stable    COMPLICATIONS: None    FINDINGS:   1.  Moderate-sized right indirect inguinal hernia identified, no obvious direct or femoral component  2.  Small tear in peritoneum occurred, closed with endoloop  3.  Repaired with laparoscopic Pro  mesh    INDICATIONS: The patient is a 60-year-old male with symptomatic right bulge in the groin.  Patient was evaluated felt to have a right inguinal hernia which was reducible.  He is counseled on surgical options and later agreed proceed with laparoscopic right inguinal hernia repair with mesh.  Patient did not want the left side evaluated.  Risks of surgery including pain, bleeding, scarring, infection, need to remove mesh, injury to testicle, vas deferens or testicular arteries, chronic groin pain, injury to bowel, seroma or hematoma were discussed with the patient.  Expressed understanding agreed proceed with surgical intervention.    PROCEDURE IN DETAIL:  After informed consent was obtained patient taken the operating room placed in supine position where general endotracheal intubation was achieved.  A received 2 g of Ancef.  Kaur catheter was sterilely placed and later removed in the case.  Bilateral arms were tucked with appropriate padding.  His abdomen was prepped and draped typical sterile fashion.  Time-out performed by members of the operative team.  Anesthetic was injected for an inferior umbilical incision which was made sharply.  Bovie was used to dissect down through subcutaneous tissue into the linea alba was identified.  We then made incision to the left lateral aspect of the linea  alba through the anterior left rectus sheath.  The muscle was then dissected away and retro muscular space was created.  Space maker balloon device was then inserted towards the pubis and inflated under visualization.  We had good hemostasis.  The balloon was let down.  We attached our port to insufflation and examined the preperitoneal space.  No obvious injury was identified and the inferior epigastrics remained against the anterior abdominal wall.  Two additional 5 mm trocars were placed in the suprapubic midline under direct visualization after injecting local anesthetic.  We then used combination of blunt sharp dissection to further clear away the preperitoneal space.  We identified the pubic tubercle and dissected out laterally cleared away Esvin's ligament taking care to avoid injury to  veins.  The direct space was identified and no significant hernia was present.  The femoral space had typical fatty tissue present but no obvious hernia.  We then turned our attention to the lateral aspect were able to take down the peritoneum laterally and moved medially towards an indirect hernia which was identified.  Were able to grasp peritoneum in gently stripped away reducing the indirect sac.  A tear did call occur in the peritoneum during dissection.  This was later closed with a 0 PDS endoloop with good approximation.  We assured we had adequate inferior dissection approximately 3 cm below the bifurcation of the vas and the testicular vessels which were identified and preserved.  We then inserted a laparoscopic Pro  mesh into the preperitoneal space this was deployed to cover all myopectineal orifices and was found to lay flat against the abdominal wall.  We had good inferior overlap of approximately 3 cm.  We then assured adequate hemostasis and then desufflated the peritoneal space under visualization.  There is no obvious folding of the mesh.  The camera port were then removed.  We made a small  incision through the posterior rectus sheath to relieve the abdomen pneumoperitoneum.  This was closed with a 0 Vicryl suture.  The anterior rectus fascia was closed with a 0 Vicryl suture.  Additional local anesthetic was injected the skin was closed with 4-0 Monocryl subcuticular stitch.  Glue was applied.  Patient was then aroused from sedation, extubated taken to recovery room in stable condition having suffered no complications.  All counts correct x2 the case.  I was present scrubbed throughout the case except for final skin closure.    DISPO:  PACU then home

## 2020-10-30 NOTE — ANESTHESIA POSTPROCEDURE EVALUATION
Anesthesia Post Evaluation    Patient: Aureliano Wilson    Procedure(s) Performed: Procedure(s) (LRB):  REPAIR, HERNIA, INGUINAL, LAPAROSCOPIC - RIGHT VS BILATERAL (Right)    Final Anesthesia Type: general    Patient location during evaluation: PACU  Patient participation: Yes- Able to Participate  Level of consciousness: awake and alert, oriented and awake  Post-procedure vital signs: reviewed and stable  Pain management: adequate  Airway patency: patent    PONV status at discharge: No PONV  Anesthetic complications: no      Cardiovascular status: blood pressure returned to baseline  Respiratory status: unassisted and room air  Hydration status: euvolemic  Follow-up not needed.          Vitals Value Taken Time   /62 10/30/20 1613   Temp 36.8 °C (98.2 °F) 10/30/20 1610   Pulse 70 10/30/20 1618   Resp 16 10/30/20 1610   SpO2 98 % 10/30/20 1618   Vitals shown include unvalidated device data.      Event Time   Out of Recovery 16:14:30         Pain/Koby Score: Pain Rating Prior to Med Admin: 7 (then falls back to sleep) (10/30/2020  4:05 PM)  Pain Rating Post Med Admin: 7 (10/30/2020  4:10 PM)  Koby Score: 10 (10/30/2020  4:10 PM)

## 2020-10-30 NOTE — DISCHARGE SUMMARY
[facility]  Discharge Note  Short Stay    Procedure(s) (LRB):  REPAIR, HERNIA, INGUINAL, LAPAROSCOPIC - RIGHT VS BILATERAL (Right)    OUTCOME: Patient tolerated treatment/procedure well without complication and is now ready for discharge.    DISPOSITION: Home or Self Care    FINAL DIAGNOSIS:  Right inguinal hernia    FOLLOWUP: In clinic    DISCHARGE INSTRUCTIONS:    Discharge Procedure Orders   Diet Adult Regular     Ice to affected area     Lifting restrictions   Order Comments: No lifting greater than 20 lb, straining, strenuous activity for 6 weeks     Notify your health care provider if you experience any of the following:  temperature >100.4     Notify your health care provider if you experience any of the following:  persistent nausea and vomiting or diarrhea     Notify your health care provider if you experience any of the following:  severe uncontrolled pain     Notify your health care provider if you experience any of the following:  redness, tenderness, or signs of infection (pain, swelling, redness, odor or green/yellow discharge around incision site)     Notify your health care provider if you experience any of the following:  worsening rash     No dressing needed   Order Comments: - A surgical glue has been placed over your incisions. Please leave the glue in place and do not attempt to remove it.   - It is ok to shower using mild soap and water over the incisions the day after your procedure. Pat dry your incisions. Do not soak in a bath tub or other body of water for 2 weeks or until cleared by your surgeon.   - If you noticed redness, swelling, fever, increasing pain or significant drainage from your wound please call the office or the hospital  after hours.     Shower on day dressing removed (No bath)

## 2020-10-30 NOTE — TRANSFER OF CARE
"Anesthesia Transfer of Care Note    Patient: Aureliano Wilson    Procedure(s) Performed: Procedure(s) (LRB):  REPAIR, HERNIA, INGUINAL, LAPAROSCOPIC - RIGHT VS BILATERAL (Right)    Patient location: PACU    Anesthesia Type: general    Transport from OR: Transported from OR on 6-10 L/min O2 by face mask with adequate spontaneous ventilation    Post pain: adequate analgesia    Post assessment: no apparent anesthetic complications    Post vital signs: stable    Level of consciousness: awake and alert    Nausea/Vomiting: no nausea/vomiting    Complications: none    Transfer of care protocol was followed      Last vitals:   Visit Vitals  /74 (BP Location: Right arm, Patient Position: Lying)   Pulse (67   Temp 36.8 °C (98.2 °F)   Resp 14   Ht 5' 11" (1.803 m)   Wt 72.6 kg (160 lb)   SpO2 100%   BMI 22.32 kg/m²     "

## 2020-10-30 NOTE — ANESTHESIA PREPROCEDURE EVALUATION
10/30/2020  Aureliano Wilson is a 60 y.o., male for laparoscopic hernia repair under [facility]    Pre-op Assessment    I have reviewed the Patient Summary Reports.     I have reviewed the Nursing Notes. I have reviewed the NPO Status.   I have reviewed the Medications.     Review of Systems  Social:  Smoker, No Alcohol Use    Cardiovascular:   Exercise tolerance: good    Pulmonary:  Pulmonary Normal    Hepatic/GI:   Bowel Prep.    Neurological:  Neurology Normal    Endocrine:  Endocrine Normal        Physical Exam  General:  Well nourished    Airway/Jaw/Neck:  Airway Findings: Mouth Opening: Normal Tongue: Normal  General Airway Assessment: Adult  Mallampati: II  TM Distance: Normal, at least 6 cm       Chest/Lungs:  Chest/Lungs Clear    Heart/Vascular:  Heart Findings: Normal            Anesthesia Plan  Type of Anesthesia, risks & benefits discussed:  Anesthesia Type:  general  Patient's Preference:   Intra-op Monitoring Plan: standard ASA monitors  Intra-op Monitoring Plan Comments:   Post Op Pain Control Plan: multimodal analgesia and per primary service following discharge from PACU  Post Op Pain Control Plan Comments:   Induction:   IV  Beta Blocker:  Patient is not currently on a Beta-Blocker (No further documentation required).       Informed Consent: Patient understands risks and agrees with Anesthesia plan.  Questions answered. Anesthesia consent signed with patient.  ASA Score: 2     Day of Surgery Review of History & Physical:  There are no significant changes.          Ready For Surgery From Anesthesia Perspective.

## 2020-10-30 NOTE — H&P
Pt seen and examined. After discussion he has agreed to proceed with laparoscopic RIH with mesh, NO left side. To OR today for procedure.    [facility]  OUTPATIENT H&P     REASON FOR VISIT/CC:  Right inguinal hernia     HPI: Aureliano Wilson is a 60 y.o. male who presents for evaluation of suspected right inguinal hernia.  Patient reports approximately 1 month history of a fluctuating bulge in the right groin with occasional discomfort and pain.  Symptoms seem to be worsened with prolonged standing or activity.  Pain is difficult to characterized.  Patient reports the bulge is usually soft and he can push it back inward.  Denies overlying skin changes.  No symptoms on the left side.  Never had previous surgery in the area.  No issues with straining to have a bowel movement or urination, no dysuria, no constipation or diarrhea.     I have reviewed the patient's chart including prior progress notes, procedures and testing.      ROS:   Review of Systems   Constitutional: Negative for activity change, chills and fever.   Respiratory: Positive for shortness of breath (Occasional with mild-to-moderate exertion). Negative for apnea, cough, chest tightness and wheezing.    Cardiovascular: Negative for chest pain, palpitations and leg swelling.   Gastrointestinal: Negative for abdominal distention, abdominal pain, nausea and vomiting.   Genitourinary: Negative for difficulty urinating, dysuria and hematuria.   Musculoskeletal: Negative for arthralgias, neck pain and neck stiffness.   Skin: Negative for color change, rash and wound.   Neurological: Negative for dizziness, syncope and light-headedness.   Psychiatric/Behavioral: Negative for agitation, behavioral problems and confusion.         PROBLEM LIST:      Patient Active Problem List   Diagnosis    Erectile dysfunction    Screening for colon cancer    Polyp of colon    Epididymitis    Abnormal stress test    Frequent PVCs            HISTORY       Past  Medical History:   Diagnosis Date    Erectile dysfunction                 Past Surgical History:   Procedure Laterality Date    COLONOSCOPY N/A 6/5/2017     Procedure: COLONOSCOPY;  Surgeon: Tung Cochran MD;  Location: [facility];  Service: Endoscopy;  Laterality: N/A;         Social History            Tobacco Use    Smoking status: Current Every Day Smoker       Packs/day: 0.25       Years: 30.00       Pack years: 7.50    Tobacco comment: 6 cigarettes a day   Substance Use Topics    Alcohol use: Yes       Alcohol/week: 2.0 standard drinks       Types: 2 Glasses of wine per week       Frequency: 2-3 times a week       Drinks per session: 1 or 2       Comment: socially    Drug use: No               Family History   Problem Relation Age of Onset    Diabetes Father      Stroke Father      Diabetes Mother              MEDS:         Current Outpatient Medications on File Prior to Visit   Medication Sig Dispense Refill    CIALIS 20 mg Tab TAKE 1 TABLET(20 MG) BY MOUTH EVERY 72 HOURS AS NEEDED (Patient not taking: Reported on 9/22/2020) 10 tablet 0    sildenafil (VIAGRA) 100 MG tablet Take 1 tablet (100 mg total) by mouth daily as needed for Erectile Dysfunction. (Patient not taking: Reported on 9/22/2020) 6 tablet 0      No current facility-administered medications on file prior to visit.          ALLERGIES:  Review of patient's allergies indicates:  No Known Allergies        VITALS:      Vitals:     09/24/20 1554   BP: 112/75   Pulse: 80            PHYSICAL EXAM:  Physical Exam  Vitals signs reviewed.   Constitutional:       Appearance: Normal appearance. He is normal weight.   HENT:      Head: Normocephalic and atraumatic.   Eyes:      General: No scleral icterus.  Neck:      Musculoskeletal: Normal range of motion. No neck rigidity.   Cardiovascular:      Rate and Rhythm: Normal rate and regular rhythm.      Pulses: Normal pulses.   Pulmonary:      Effort: Pulmonary effort is normal. No respiratory  distress.      Breath sounds: Normal breath sounds.   Abdominal:      General: There is no distension.      Palpations: Abdomen is soft. There is no mass (Reducible right inguinal hernia).      Tenderness: There is no abdominal tenderness.      Hernia: A hernia (Reducible small to moderate-sized right inguinal hernia with no significant overlying skin changes; pressure felt on Valsalva on the left side but no obvious hernia) is present.   Musculoskeletal: Normal range of motion.         General: No swelling or tenderness.   Neurological:      General: No focal deficit present.      Mental Status: He is alert and oriented to person, place, and time.      Motor: No weakness.   Psychiatric:         Mood and Affect: Mood normal.         Behavior: Behavior normal.         Thought Content: Thought content normal.         Judgment: Judgment normal.               LABS:        Lab Results   Component Value Date     WBC 6.94 02/28/2018     RBC 4.78 02/28/2018     HGB 13.7 (L) 02/28/2018     HCT 40.9 02/28/2018      02/28/2018            Lab Results   Component Value Date     GLU 88 02/28/2018      02/28/2018     K 4.3 02/28/2018      02/28/2018     CO2 26 02/28/2018     BUN 12 02/28/2018     CREATININE 1.0 02/28/2018     CALCIUM 9.1 02/28/2018            Lab Results   Component Value Date     ALT 18 02/28/2018     AST 18 02/28/2018     ALKPHOS 56 02/28/2018     BILITOT 0.7 02/28/2018      No results found for: MG, PHOS     STUDIES:  Echo images and reports were personally reviewed.  Echocardiogram 07/15/2019  · Left ventricular systolic function. The estimated ejection fraction is 55%  · Normal LV diastolic function.  · Normal right ventricular systolic function.  · Normal central venous pressure (3 mm Hg).  · The estimated PA systolic pressure is 14 mm Hg        ASSESSMENT & PLAN:  60 y.o. male with reducible right inguinal hernia, questionable left inguinal hernia     - diagnosis of inguinal hernia were  discussed with the patient and his wife who was on the phone as well as potential treatment options which include observation versus surgical intervention, patient wishes to proceed surgical intervention to prevent worsening symptoms  - open and minimally invasive approaches were also discussed including the potential risk and benefits as well as the preferred use of mesh and repair  - patient's wife expressed concerns about utilizing mesh stating it is unnatural for a foreign body to be placed in the tissue, she also had concerns with a robotic approach due to the reproduce testing of instruments; these concerns were addressed and further information including the use of print outs were provided to the patient to further review  - he will contact the office with this decision to proceed with surgical intervention  - if the patient desires to avoid mesh this would obligate us to an open repair as there are no standardized techniques for mesh free minimally invasive repairs  - he is a reason a candidate for open approach without mesh given his body habitus, small size of hernia and no previous surgeries  - patient would require EKG prior to consideration for general anesthesia given previous history is a PVCs and bigeminy, he was not able to obtain a nuclear medicine myocardial perfusion study due to reaction to the contrast and claustrophobia, he did have an echo which showed good cardiac function

## 2020-10-30 NOTE — DISCHARGE INSTRUCTIONS
Discharge Instructions for Hernia Repair  You had a procedure called hernia repair. Also called a rupture, a hernia is a tear or weakness in the wall of the belly. This weakness may be present at birth. Or it can be caused by the wear and tear of daily living. Hernias may get worse with time or with physical stress. But surgery can help repair the weakness and eliminate symptoms.  Activity after surgery  Recommendations include the following:  · After surgery, take it easy for the rest of the day. If you had general anesthesia, dont use machinery or power tools, drink alcohol, or make any major decisions for at least the first 24 hours.  · Dont drive while you are still taking opioid pain medicine, and dont drive until you are able to step firmly on the brake pedal without hesitation.  · Ask others to help with chores and errands while you recover.  · Dont lift anything heavier than 10 pounds until your healthcare provider says its OK.  · Dont mow the lawn, use a vacuum , or do other strenuous activities until your healthcare provider says its OK.  · Walk as often as you feel able.  · Continue the coughing and deep breathing exercises that you learned in the hospital.  · Ask your healthcare provider when you can expect to return to work.  · Avoid constipation:  ¨ Eat fruits, vegetables, and whole grains.  ¨ Drink 6 to 8 glasses of water a day, unless otherwise instructed.  ¨ Use a laxative or a mild stool softener as instructed by your healthcare provider.  Bandage and incision care  Tips include the following:  · Do not get the bandage or wound wet for 48 hours.  · If strips of tape were used to close your incision, dont pull them off. Let them fall off on their own.  · Remove any gauze bandage in 48 hours.  · Wash your incision with mild soap and water. Pat it dry. Dont use oils, powders, or lotions on your incision. Do not soak your incision or take tub baths until cleared by your healthcare  provider.  Follow-up care  Keep follow-up appointments during your recovery. These allow your healthcare provider to check your progress and make sure youre healing well. You may also need to have your stitches, staples, or bandage removed. During office visits, tell your healthcare provider if you have any new symptoms. And be sure to ask any questions you have.     When to call your healthcare provider  Call your healthcare provider immediately if you have any of the following:  · A large amount of swelling or bruising (some testicular swelling and bruising is common)  · Bleeding  · Increasing pain  · Increased redness or drainage of the incision  · Fever of 100.4°F (38.0°C) or higher, or as directed by your healthcare provider  · Trouble urinating  · Nausea or vomiting   Date Last Reviewed: 7/1/2016 © 2000-2017 [employer]. [address] [place] [id number]. All rights reserved. This information is not intended as a substitute for professional medical care. Always follow your healthcare professional's instructions.  ANESTHESIA  -For the first 24 hours after surgery:  Do not drive, use heavy equipment, make important decisions, or drink alcohol  -It is normal to feel sleepy for several hours.  Rest until you are more awake.  -Have someone stay with you, if needed.  They can watch for problems and help keep you safe.  -Some possible post anesthesia side effects include: nausea and vomiting, sore throat and hoarseness, sleepiness, and dizziness.    PAIN  -If you have pain after surgery, pain medicine will help you feel better.  Take it as directed, before pain becomes severe.  Most pain relievers taken by mouth need at least 20-30 minutes to start working.  -Do not drive or drink alcohol while taking pain medicine.  -Pain medication can upset your stomach.  Taking them with a little food may help.  -Other ways to help control pain: elevation, ice, and relaxation  -Call your surgeon if still  having unmanageable pain an hour after taking pain medicine.  -Pain medicine can cause constipation.  Taking an over-the counter stool softener while on prescription pain medicine and drinking plenty of fluids can prevent this side effect.  -Call your surgeon if you have severe side effects like: breathing problems, trouble waking up, dizziness, confusion, or severe constipation.    NAUSEA  -Some people have nausea after surgery.  This is often because of anesthesia, pain, pain medicine, or the stress of surgery.  -Do not push yourself to eat.  Start off with clear liquids and soup.  Slowly move to solid foods.  Don't eat fatty, rich, spicy foods at first.  Eat smaller amounts.  -If you develop persistent nausea and vomiting please notify your surgeon immediately.    BLEEDING  -Different types of surgery require different types of care and dressing changes.  It is important to follow all instructions and advice from your surgeon.  Change dressing as directed.  Call your surgeon for any concerns regarding postop bleeding.    SIGNS OF INFECTION  -Signs of infection include: fever, swelling, drainage, and redness  -Notify your surgeon if you have a fever of 100.4 F (38.0 C) or higher.  -Notify your surgeon if you notice redness, swelling, increased pain, pus, or a foul smell at the incision site.    Oxycodone tablets or capsules  What is this medicine?  OXYCODONE (ox i KOE done) is a pain reliever. It is used to treat moderate to severe pain.  How should I use this medicine?  Take this medicine by mouth with a glass of water. Follow the directions on the prescription label. You can take it with or without food. If it upsets your stomach, take it with food. Take your medicine at regular intervals. Do not take it more often than directed. Do not stop taking except on your doctor's advice.  Some brands of this medicine, like Oxecta, have special instructions. Ask your doctor or pharmacist if these directions are for you:  Do not cut, crush or chew this medicine. Swallow only one tablet at a time. Do not wet, soak, or lick the tablet before you take it.  A special MedGuide will be given to you by the pharmacist with each prescription and refill. Be sure to read this information carefully each time.  Talk to your pediatrician regarding the use of this medicine in children. Special care may be needed.  What side effects may I notice from receiving this medicine?  Side effects that you should report to your doctor or health care professional as soon as possible:  · allergic reactions like skin rash, itching or hives, swelling of the face, lips, or tongue  · breathing problems  · confusion  · signs and symptoms of low blood pressure like dizziness; feeling faint or lightheaded, falls; unusually weak or tired  · trouble passing urine or change in the amount of urine  · trouble swallowing  Side effects that usually do not require medical attention (report to your doctor or health care professional if they continue or are bothersome):  · constipation  · dry mouth  · nausea, vomiting  · tiredness  What may interact with this medicine?  This medicine may interact with the following medications:  · alcohol  · antihistamines for allergy, cough and cold  · antiviral medicines for HIV or AIDS  · atropine  · certain antibiotics like clarithromycin, erythromycin, linezolid, rifampin  · certain medicines for anxiety or sleep  · certain medicines for bladder problems like oxybutynin, tolterodine  · certain medicines for depression like amitriptyline, fluoxetine, sertraline  · certain medicines for fungal infections like ketoconazole, itraconazole, voriconazole  · certain medicines for migraine headache like almotriptan, eletriptan, frovatriptan, naratriptan, rizatriptan, sumatriptan, zolmitriptan  · certain medicines for nausea or vomiting like dolasetron, ondansetron, palonosetron  · certain medicines for Parkinson's disease like benztropine,  trihexyphenidyl  · certain medicines for seizures like phenobarbital, phenytoin, primidone  · certain medicines for stomach problems like dicyclomine, hyoscyamine  · certain medicines for travel sickness like scopolamine  · diuretics  · general anesthetics like halothane, isoflurane, methoxyflurane, propofol  · ipratropium  · local anesthetics like lidocaine, pramoxine, tetracaine  · MAOIs like Carbex, Eldepryl, Marplan, Nardil, and Parnate  · medicines that relax muscles for surgery  · methylene blue  · nilotinib  · other narcotic medicines for pain or cough  · phenothiazines like chlorpromazine, mesoridazine, prochlorperazine, thioridazine  What if I miss a dose?  If you miss a dose, take it as soon as you can. If it is almost time for your next dose, take only that dose. Do not take double or extra doses.  Where should I keep my medicine?  Keep out of the reach of children. This medicine can be abused. Keep your medicine in a safe place to protect it from theft. Do not share this medicine with anyone. Selling or giving away this medicine is dangerous and against the law.  Store at room temperature between 15 and 30 degrees C (59 and 86 degrees F). Protect from light. Keep container tightly closed.  This medicine may cause accidental overdose and death if it is taken by other adults, children, or pets. Flush any unused medicine down the toilet to reduce the chance of harm. Do not use the medicine after the expiration date.  What should I tell my health care provider before I take this medicine?  They need to know if you have any of these conditions:  · [place]'s disease  · brain tumor  · head injury  · heart disease  · history of drug or alcohol abuse problem  · if you often drink alcohol  · kidney disease  · liver disease  · lung or breathing disease, like asthma  · mental illness  · pancreatic disease  · seizures  · thyroid disease  · an unusual or allergic reaction to oxycodone, codeine, hydrocodone, morphine,  other medicines, foods, dyes, or preservatives  · pregnant or trying to get pregnant  · breast-feeding  What should I watch for while using this medicine?  Tell your doctor or health care professional if your pain does not go away, if it gets worse, or if you have new or a different type of pain. You may develop tolerance to the medicine. Tolerance means that you will need a higher dose of the medicine for pain relief. Tolerance is normal and is expected if you take this medicine for a long time.  Do not suddenly stop taking your medicine because you may develop a severe reaction. Your body becomes used to the medicine. This does NOT mean you are addicted. Addiction is a behavior related to getting and using a drug for a non-medical reason. If you have pain, you have a medical reason to take pain medicine. Your doctor will tell you how much medicine to take. If your doctor wants you to stop the medicine, the dose will be slowly lowered over time to avoid any side effects.  There are different types of narcotic medicines (opiates). If you take more than one type at the same time or if you are taking another medicine that also causes drowsiness, you may have more side effects. Give your health care provider a list of all medicines you use. Your doctor will tell you how much medicine to take. Do not take more medicine than directed. Call emergency for help if you have problems breathing or unusual sleepiness.  You may get drowsy or dizzy. Do not drive, use machinery, or do anything that needs mental alertness until you know how the medicine affects you. Do not stand or sit up quickly, especially if you are an older patient. This reduces the risk of dizzy or fainting spells. Alcohol may interfere with the effect of this medicine. Avoid alcoholic drinks.  This medicine will cause constipation. Try to have a bowel movement at least every 2 to 3 days. If you do not have a bowel movement for 3 days, call your doctor or  health care professional.  Your mouth may get dry. Chewing sugarless gum or sucking hard candy, and drinking plenty of water may help. Contact your doctor if the problem does not go away or is severe.  NOTE:This sheet is a summary. It may not cover all possible information. If you have questions about this medicine, talk to your doctor, pharmacist, or health care provider. Copyright© 2017 [employer]    Acetaminophen tablets or caplets  What is this medicine?  ACETAMINOPHEN (a set a ALLEN mary fen) is a pain reliever. It is used to treat mild pain and fever.  How should I use this medicine?  Take this medicine by mouth with a glass of water. Follow the directions on the package or prescription label. Take your medicine at regular intervals. Do not take your medicine more often than directed.  Talk to your pediatrician regarding the use of this medicine in children. While this drug may be prescribed for children as young as 6 years of age for selected conditions, precautions do apply.  What side effects may I notice from receiving this medicine?  Side effects that you should report to your doctor or health care professional as soon as possible:  · allergic reactions like skin rash, itching or hives, swelling of the face, lips, or tongue  · breathing problems  · fever or sore throat  · redness, blistering, peeling or loosening of the skin, including inside the mouth  · trouble passing urine or change in the amount of urine  · unusual bleeding or bruising  · unusually weak or tired  · yellowing of the eyes or skin  Side effects that usually do not require medical attention (report to your doctor or health care professional if they continue or are bothersome):  · headache  · nausea, stomach upset  What may interact with this medicine?  · alcohol  · imatinib  · isoniazid  · other medicines with acetaminophen  What if I miss a dose?  If you miss a dose, take it as soon as you can. If it is almost time for your next dose,  take only that dose. Do not take double or extra doses.  Where should I keep my medicine?  Keep out of reach of children.  Store at room temperature between 20 and 25 degrees C (68 and 77 degrees F). Protect from moisture and heat. Throw away any unused medicine after the expiration date.  What should I tell my health care provider before I take this medicine?  They need to know if you have any of these conditions:  · if you often drink alcohol  · liver disease  · an unusual or allergic reaction to acetaminophen, other medicines, foods, dyes, or preservatives  · pregnant or trying to get pregnant  · breast-feeding  What should I watch for while using this medicine?  Tell your doctor or health care professional if the pain lasts more than 10 days (5 days for children), if it gets worse, or if there is a new or different kind of pain. Also, check with your doctor if a fever lasts for more than 3 days.  Do not take other medicines that contain acetaminophen with this medicine. Always read labels carefully. If you have questions, ask your doctor or pharmacist.  If you take too much acetaminophen get medical help right away. Too much acetaminophen can be very dangerous and cause liver damage. Even if you do not have symptoms, it is important to get help right away.  NOTE:This sheet is a summary. It may not cover all possible information. If you have questions about this medicine, talk to your doctor, pharmacist, or health care provider. Copyright© 2017 [employer]    Ibuprofen tablets and capsules  What is this medicine?  IBUPROFEN (eye BYOO proe fen) is a non-steroidal anti-inflammatory drug (NSAID). It is used for dental pain, fever, headaches or migraines, osteoarthritis, rheumatoid arthritis, or painful monthly periods. It can also relieve minor aches and pains caused by a cold, flu, or sore throat.  How should I use this medicine?  Take this medicine by mouth with a glass of water. Follow the directions on the  prescription label. Take this medicine with food if your stomach gets upset. Try to not lie down for at least 10 minutes after you take the medicine. Take your medicine at regular intervals. Do not take your medicine more often than directed.  A special MedGuide will be given to you by the pharmacist with each prescription and refill. Be sure to read this information carefully each time.  Talk to your pediatrician regarding the use of this medicine in children. Special care may be needed.  What side effects may I notice from receiving this medicine?  Side effects that you should report to your doctor or health care professional as soon as possible:  · allergic reactions like skin rash, itching or hives, swelling of the face, lips, or tongue  · severe stomach pain  · signs and symptoms of bleeding such as bloody or black, tarry stools; red or dark-brown urine; spitting up blood or brown material that looks like coffee grounds; red spots on the skin; unusual bruising or bleeding from the eye, gums, or nose  · signs and symptoms of a blood clot such as changes in vision; chest pain; severe, sudden headache; trouble speaking; sudden numbness or weakness of the face, arm, or leg  · unexplained weight gain or swelling  · unusually weak or tired  · yellowing of eyes or skin  Side effects that usually do not require medical attention (report to your doctor or health care professional if they continue or are bothersome):  · bruising  · diarrhea  · dizziness, drowsiness  · headache  · nausea, vomiting  What may interact with this medicine?  Do not take this medicine with any of the following medications:  · cidofovir  · ketorolac  · methotrexate  · pemetrexed  This medicine may also interact with the following medications:  · alcohol  · aspirin  · diuretics  · lithium  · other drugs for inflammation like prednisone  · warfarin  What if I miss a dose?  If you miss a dose, take it as soon as you can. If it is almost time for  your next dose, take only that dose. Do not take double or extra doses.  Where should I keep my medicine?  Keep out of the reach of children.  Store at room temperature between 15 and 30 degrees C (59 and 86 degrees F). Keep container tightly closed. Throw away any unused medicine after the expiration date.  What should I tell my health care provider before I take this medicine?  They need to know if you have any of these conditions:  · asthma  · cigarette smoker  · drink more than 3 alcohol containing drinks a day  · heart disease or circulation problems such as heart failure or leg edema (fluid retention)  · high blood pressure  · kidney disease  · liver disease  · stomach bleeding or ulcers  · an unusual or allergic reaction to ibuprofen, aspirin, other NSAIDS, other medicines, foods, dyes, or preservatives  · pregnant or trying to get pregnant  · breast-feeding  What should I watch for while using this medicine?  Tell your doctor or healthcare professional if your symptoms do not start to get better or if they get worse.  This medicine does not prevent heart attack or stroke. In fact, this medicine may increase the chance of a heart attack or stroke. The chance may increase with longer use of this medicine and in people who have heart disease. If you take aspirin to prevent heart attack or stroke, talk with your doctor or health care professional.  Do not take other medicines that contain aspirin, ibuprofen, or naproxen with this medicine. Side effects such as stomach upset, nausea, or ulcers may be more likely to occur. Many medicines available without a prescription should not be taken with this medicine.  This medicine can cause ulcers and bleeding in the stomach and intestines at any time during treatment. Ulcers and bleeding can happen without warning symptoms and can cause death. To reduce your risk, do not smoke cigarettes or drink alcohol while you are taking this medicine.  You may get drowsy or dizzy.  Do not drive, use machinery, or do anything that needs mental alertness until you know how this medicine affects you. Do not stand or sit up quickly, especially if you are an older patient. This reduces the risk of dizzy or fainting spells.  This medicine can cause you to bleed more easily. Try to avoid damage to your teeth and gums when you brush or floss your teeth.  This medicine may be used to treat migraines. If you take migraine medicines for 10 or more days a month, your migraines may get worse. Keep a diary of headache days and medicine use. Contact your healthcare professional if your migraine attacks occur more frequently.  NOTE:This sheet is a summary. It may not cover all possible information. If you have questions about this medicine, talk to your doctor, pharmacist, or health care provider. Copyright© 2017 [employer]

## 2020-10-31 NOTE — PLAN OF CARE
Straight cath for 400 ml clear yellow urine.    Meets criteria for discharge. VSS, AAOx3. Tolerating po fluids without nausea. IV discontinued with tip intact.Discharge instructions given. Time allowed for questions. Verbalizes understanding. Home prescriptions received from Pharmacy. Discharged to home in good condition.

## 2020-11-04 ENCOUNTER — NURSE TRIAGE (OUTPATIENT)
Dept: ADMINISTRATIVE | Facility: CLINIC | Age: 60
End: 2020-11-04

## 2020-11-04 NOTE — TELEPHONE ENCOUNTER
Reason for Disposition   [1] Caller has URGENT question AND [2] triager unable to answer question    Additional Information   Negative: Sounds like a life-threatening emergency to the triager   Negative: Chest pain   Negative: Difficulty breathing   Negative: Acting confused (e.g., disoriented, slurred speech) or excessively sleepy   Negative: Surgical incision symptoms and questions   Negative: [1] Discomfort (pain, burning or stinging) when passing urine AND [2] male   Negative: [1] Discomfort (pain, burning or stinging) when passing urine AND [2] female   Negative: Constipation   Negative: New or worsening leg (calf, thigh) pain   Negative: New or worsening leg swelling   Negative: Dizziness is severe, or persists > 24 hours after surgery   Negative: Pain, redness, swelling, or pus at IV Site   Negative: Symptoms arising from use of a urinary catheter (Kaur or Coude)   Negative: Cast problems or questions   Negative: Medication question   Negative: [1] Widespread rash AND [2] bright red, sunburn-like   Negative: [1] SEVERE headache AND [2] after spinal (epidural) anesthesia   Negative: [1] Vomiting AND [2] persists > 4 hours   Negative: [1] Vomiting AND [2] abdomen looks much more swollen than usual   Negative: [1] Drinking very little AND [2] dehydration suspected (e.g., no urine > 12 hours, very dry mouth, very lightheaded)   Negative: Patient sounds very sick or weak to the triager   Negative: Sounds like a serious complication to the triager   Negative: Fever > 100.4 F (38.0 C)   Negative: [1] SEVERE post-op pain (e.g., excruciating, pain scale 8-10) AND [2] not controlled with pain medications    Protocols used: POST-OP SYMPTOMS AND [id number]    Hernia surgery Friday (right inguinal hernia). Bruise in the area that hernia was located about the diameter of a dime. Incision site has no bruising. Wife states it looks like blood pooling under the skin. Denies any other symptoms  such as increasing pain, swelling, fever. Advised this should be normal but would send a message to Dr. England office requesting a call back in case any need for evaluation.

## 2020-11-05 ENCOUNTER — PATIENT MESSAGE (OUTPATIENT)
Dept: SURGERY | Facility: CLINIC | Age: 60
End: 2020-11-05

## 2020-11-17 ENCOUNTER — OFFICE VISIT (OUTPATIENT)
Dept: SURGERY | Facility: CLINIC | Age: 60
End: 2020-11-17
Payer: COMMERCIAL

## 2020-11-17 VITALS
HEART RATE: 82 BPM | SYSTOLIC BLOOD PRESSURE: 116 MMHG | HEIGHT: 71 IN | BODY MASS INDEX: 22.67 KG/M2 | WEIGHT: 161.94 LBS | DIASTOLIC BLOOD PRESSURE: 72 MMHG

## 2020-11-17 DIAGNOSIS — Z87.19 STATUS POST INGUINAL HERNIA REPAIR: Primary | ICD-10-CM

## 2020-11-17 DIAGNOSIS — Z98.890 STATUS POST INGUINAL HERNIA REPAIR: Primary | ICD-10-CM

## 2020-11-17 PROCEDURE — 99999 PR PBB SHADOW E&M-EST. PATIENT-LVL III: CPT | Mod: PBBFAC,,, | Performed by: SURGERY

## 2020-11-17 PROCEDURE — 1125F PR PAIN SEVERITY QUANTIFIED, PAIN PRESENT: ICD-10-PCS | Mod: S$GLB,,, | Performed by: SURGERY

## 2020-11-17 PROCEDURE — 99024 PR POST-OP FOLLOW-UP VISIT: ICD-10-PCS | Mod: S$GLB,,, | Performed by: SURGERY

## 2020-11-17 PROCEDURE — 1125F AMNT PAIN NOTED PAIN PRSNT: CPT | Mod: S$GLB,,, | Performed by: SURGERY

## 2020-11-17 PROCEDURE — 99999 PR PBB SHADOW E&M-EST. PATIENT-LVL III: ICD-10-PCS | Mod: PBBFAC,,, | Performed by: SURGERY

## 2020-11-17 PROCEDURE — 99024 POSTOP FOLLOW-UP VISIT: CPT | Mod: S$GLB,,, | Performed by: SURGERY

## 2020-11-17 PROCEDURE — 3008F BODY MASS INDEX DOCD: CPT | Mod: CPTII,S$GLB,, | Performed by: SURGERY

## 2020-11-17 PROCEDURE — 3008F PR BODY MASS INDEX (BMI) DOCUMENTED: ICD-10-PCS | Mod: CPTII,S$GLB,, | Performed by: SURGERY

## 2020-11-17 NOTE — PROGRESS NOTES
" Clinic Follow-up  General Surgery    Date: 11/17/2020  Interval: Aureliano Wilson is a 60 y.o. male seen today in follow up s/p laparoscopic right inguinal hernia repair with mesh on 10/30/20.        SUBJECTIVE:     Patient is doing well post-operatively. Patient states pain is well controlled, he has occasional soreness when bending over or getting up from a seated position. He reports that his lower abdomen has remained distended since surgery, he feels bloated. He is tolerating his regular diet without nausea or vomiting. He has been having normal bowel movements without constipation or diarrhea. Urinating without difficulty. Denies fevers or chills. Does complain of decreased sensation in entire right foot that has been persistent since surgery, denies any other areas of numbness, no shooting pain down that extremity.    OBJECTIVE:     Vital Signs (Most Recent)  Vitals:    11/17/20 1548   BP: 116/72   BP Location: Left arm   Patient Position: Sitting   Pulse: 82   Weight: 73.5 kg (161 lb 14.9 oz)   Height: 5' 11" (1.803 m)       Physical Exam:  General: well developed, well nourished, no distress  Lungs:  clear to auscultation bilaterally and normal respiratory effort  Heart: regular rate and rhythm, S1, S2 normal, no murmur, rub or gallop  Abdomen: soft, non-tender to palpation, distension of the lower abdomen but no rigidity. Distended portion not fluctuant or fluid feeling, more consistent with air. Circumfrence of abdomen measures 91.5 cm from ASIS across midline incision.  Neuro: Able to discern blunt vs sharp pain in all portions of right foot, but states that it feels "less" when compared to the left foot.     Wound/Incision:  Well healed laparoscopic incisions. No surrounding erythema or induration. No drainage from wounds.     Review of Systems - Pertinent per HPI, otherwise negative.    Laboratory:  None    Pathology: None      ASSESSMENT/PLAN:     Assessment: 61 yo M presents for 2 week follow up s/p " laparoscopic right inguinal hernia repair with mesh. Doing well post-operatively. Abdominal distension consistent with contained air, not concerning for a fluid collection or hematoma. No signs of infection.      Plan:   - Follow up in 2 weeks to reassess abdomen, sooner if notices any changes  - May consider CT scan at next visit if continues to have distension without improvement  - Continue light duty restrictions until 12/11/20    Eve Murphy MD  General Surgery, PGY-II  Pager: [phone]  11/17/2020 3:53 PM

## 2020-12-01 ENCOUNTER — OFFICE VISIT (OUTPATIENT)
Dept: SURGERY | Facility: CLINIC | Age: 60
End: 2020-12-01
Payer: COMMERCIAL

## 2020-12-01 VITALS
BODY MASS INDEX: 22.33 KG/M2 | DIASTOLIC BLOOD PRESSURE: 79 MMHG | WEIGHT: 159.5 LBS | SYSTOLIC BLOOD PRESSURE: 121 MMHG | HEIGHT: 71 IN | HEART RATE: 85 BPM

## 2020-12-01 DIAGNOSIS — Z87.19 S/P INGUINAL HERNIA REPAIR: Primary | ICD-10-CM

## 2020-12-01 DIAGNOSIS — Z98.890 S/P INGUINAL HERNIA REPAIR: Primary | ICD-10-CM

## 2020-12-01 PROCEDURE — 99024 PR POST-OP FOLLOW-UP VISIT: ICD-10-PCS | Mod: S$GLB,,, | Performed by: SURGERY

## 2020-12-01 PROCEDURE — 3008F BODY MASS INDEX DOCD: CPT | Mod: CPTII,S$GLB,, | Performed by: SURGERY

## 2020-12-01 PROCEDURE — 99999 PR PBB SHADOW E&M-EST. PATIENT-LVL III: CPT | Mod: PBBFAC,,, | Performed by: SURGERY

## 2020-12-01 PROCEDURE — 99999 PR PBB SHADOW E&M-EST. PATIENT-LVL III: ICD-10-PCS | Mod: PBBFAC,,, | Performed by: SURGERY

## 2020-12-01 PROCEDURE — 3008F PR BODY MASS INDEX (BMI) DOCUMENTED: ICD-10-PCS | Mod: CPTII,S$GLB,, | Performed by: SURGERY

## 2020-12-01 PROCEDURE — 1125F AMNT PAIN NOTED PAIN PRSNT: CPT | Mod: S$GLB,,, | Performed by: SURGERY

## 2020-12-01 PROCEDURE — 99024 POSTOP FOLLOW-UP VISIT: CPT | Mod: S$GLB,,, | Performed by: SURGERY

## 2020-12-01 PROCEDURE — 1125F PR PAIN SEVERITY QUANTIFIED, PAIN PRESENT: ICD-10-PCS | Mod: S$GLB,,, | Performed by: SURGERY

## 2020-12-01 NOTE — PROGRESS NOTES
" Clinic Follow-up  General Surgery    Date: 12/01/2020  Aureliano Wilson is a 60 y.o. male s/p laparoscopic right inguinal hernia repair with mesh on 10/30/20.  At initial postop follow-up patient swelling/retained air in the lower midline.  He was afebrile, having bowel function, having urinary function, pain was well controlled, tolerating diet.  Offered CT scan at the time patient deferred and elected to observe due to claustrophobia.      SUBJECTIVE:     Here today for follow-up.  Patient continues to do well.  Swelling is slightly improved.  Has occasional discomfort in the right mid abdomen but states that is not overtly painful.  Continues to tolerated diet have bowel function.  No issues with urination.  Denies fevers or chills.  No significant pain or swelling in the groin.      OBJECTIVE:     Vital Signs (Most Recent)  Vitals:    12/01/20 1557   BP: 121/79   BP Location: Left arm   Patient Position: Sitting   Pulse: 85   Weight: 72.3 kg (159 lb 8 oz)   Height: 5' 11" (1.803 m)       Physical Exam:  General: well developed, well nourished, no distress  Lungs:  clear to auscultation bilaterally and normal respiratory effort  Heart: regular rate and rhythm, S1, S2 normal, no murmur, rub or gallop  Abdomen: soft, non-tender to palpation, distension of the lower abdomen but no rigidity. Distended portion not fluctuant or fluid feeling, more consistent with air. Circumfrence of abdomen measures 84.5 cm from ASIS across midline incision.    Wound/Incision:  Well healed laparoscopic incisions. No surrounding erythema or induration. No drainage from wounds.     Review of Systems - Pertinent per HPI, otherwise negative.      ASSESSMENT/PLAN:     Assessment: 61 yo M presents for 2 week follow up s/p laparoscopic right inguinal hernia repair with mesh. Doing well post-operatively. Abdominal distension possible retained air versus fluid collection.    Plan:   - area of swelling/fullness slightly decreased, patient " remains asymptomatic, discussed potential need for CT scan versus observation, after discussion patient will continue to observe, will arrange follow-up in 4 weeks, patient will contact us with any questions or concerns    12/1/2020 3:53 PM

## 2021-01-12 ENCOUNTER — OFFICE VISIT (OUTPATIENT)
Dept: SURGERY | Facility: CLINIC | Age: 61
End: 2021-01-12
Payer: COMMERCIAL

## 2021-01-12 VITALS
SYSTOLIC BLOOD PRESSURE: 116 MMHG | BODY MASS INDEX: 23.15 KG/M2 | WEIGHT: 165.38 LBS | HEIGHT: 71 IN | DIASTOLIC BLOOD PRESSURE: 65 MMHG | HEART RATE: 75 BPM

## 2021-01-12 DIAGNOSIS — Z98.890 STATUS POST INGUINAL HERNIA REPAIR: ICD-10-CM

## 2021-01-12 DIAGNOSIS — Z87.19 STATUS POST INGUINAL HERNIA REPAIR: ICD-10-CM

## 2021-01-12 PROCEDURE — 99999 PR PBB SHADOW E&M-EST. PATIENT-LVL III: ICD-10-PCS | Mod: PBBFAC,,, | Performed by: SURGERY

## 2021-01-12 PROCEDURE — 99024 POSTOP FOLLOW-UP VISIT: CPT | Mod: S$GLB,,, | Performed by: SURGERY

## 2021-01-12 PROCEDURE — 99213 OFFICE O/P EST LOW 20 MIN: CPT | Mod: PBBFAC,PO | Performed by: SURGERY

## 2021-01-12 PROCEDURE — 99999 PR PBB SHADOW E&M-EST. PATIENT-LVL III: CPT | Mod: PBBFAC,,, | Performed by: SURGERY

## 2021-01-12 PROCEDURE — 99024 PR POST-OP FOLLOW-UP VISIT: ICD-10-PCS | Mod: S$GLB,,, | Performed by: SURGERY

## 2021-01-12 RX ORDER — GABAPENTIN 100 MG/1
100 CAPSULE ORAL 3 TIMES DAILY
Qty: 90 CAPSULE | Refills: 3 | Status: SHIPPED | OUTPATIENT
Start: 2021-01-12 | End: 2021-12-06

## 2021-01-15 PROBLEM — Z98.890 STATUS POST INGUINAL HERNIA REPAIR: Status: ACTIVE | Noted: 2021-01-15

## 2021-01-15 PROBLEM — Z87.19 STATUS POST INGUINAL HERNIA REPAIR: Status: ACTIVE | Noted: 2021-01-15

## 2021-04-16 ENCOUNTER — PATIENT MESSAGE (OUTPATIENT)
Dept: RESEARCH | Facility: HOSPITAL | Age: 61
End: 2021-04-16

## 2021-08-13 ENCOUNTER — PATIENT MESSAGE (OUTPATIENT)
Dept: SURGERY | Facility: CLINIC | Age: 61
End: 2021-08-13

## 2021-08-18 DIAGNOSIS — R10.31 CHRONIC GROIN PAIN, RIGHT: Primary | ICD-10-CM

## 2021-08-18 DIAGNOSIS — Z87.19 H/O RIGHT INGUINAL HERNIA REPAIR: ICD-10-CM

## 2021-08-18 DIAGNOSIS — Z98.890 H/O RIGHT INGUINAL HERNIA REPAIR: ICD-10-CM

## 2021-08-18 DIAGNOSIS — G89.29 CHRONIC GROIN PAIN, RIGHT: Primary | ICD-10-CM

## 2021-08-19 ENCOUNTER — TELEPHONE (OUTPATIENT)
Dept: FAMILY MEDICINE | Facility: CLINIC | Age: 61
End: 2021-08-19

## 2021-08-20 ENCOUNTER — TELEPHONE (OUTPATIENT)
Dept: SURGERY | Facility: CLINIC | Age: 61
End: 2021-08-20

## 2021-08-23 ENCOUNTER — TELEPHONE (OUTPATIENT)
Dept: FAMILY MEDICINE | Facility: CLINIC | Age: 61
End: 2021-08-23

## 2021-09-23 ENCOUNTER — HOSPITAL ENCOUNTER (OUTPATIENT)
Dept: RADIOLOGY | Facility: HOSPITAL | Age: 61
Discharge: HOME | End: 2021-09-23
Attending: SURGERY
Payer: COMMERCIAL

## 2021-09-23 DIAGNOSIS — Z87.19 H/O RIGHT INGUINAL HERNIA REPAIR: ICD-10-CM

## 2021-09-23 DIAGNOSIS — Z98.890 H/O RIGHT INGUINAL HERNIA REPAIR: ICD-10-CM

## 2021-09-23 DIAGNOSIS — R10.31 CHRONIC GROIN PAIN, RIGHT: ICD-10-CM

## 2021-09-23 DIAGNOSIS — G89.29 CHRONIC GROIN PAIN, RIGHT: ICD-10-CM

## 2021-09-23 PROCEDURE — 76882 US SOFT TISSUE, GROIN RIGHT: ICD-10-PCS | Mod: 26,RT,, | Performed by: RADIOLOGY

## 2021-09-23 PROCEDURE — 76882 US LMTD JT/FCL EVL NVASC XTR: CPT | Mod: TC,RT

## 2021-09-23 PROCEDURE — 76882 US LMTD JT/FCL EVL NVASC XTR: CPT | Mod: 26,RT,, | Performed by: RADIOLOGY

## 2021-09-25 ENCOUNTER — PATIENT MESSAGE (OUTPATIENT)
Dept: SURGERY | Facility: CLINIC | Age: 61
End: 2021-09-25

## 2021-12-01 ENCOUNTER — OFFICE VISIT (OUTPATIENT)
Dept: FAMILY MEDICINE | Facility: HOSPITAL | Age: 61
End: 2021-12-01
Attending: STUDENT IN AN ORGANIZED HEALTH CARE EDUCATION/TRAINING PROGRAM
Payer: COMMERCIAL

## 2021-12-01 VITALS
SYSTOLIC BLOOD PRESSURE: 131 MMHG | DIASTOLIC BLOOD PRESSURE: 86 MMHG | HEART RATE: 81 BPM | BODY MASS INDEX: 23.06 KG/M2 | HEIGHT: 71 IN

## 2021-12-01 DIAGNOSIS — S91.114A LACERATION OF LESSER TOE OF RIGHT FOOT WITHOUT FOREIGN BODY PRESENT OR DAMAGE TO NAIL, INITIAL ENCOUNTER: Primary | ICD-10-CM

## 2021-12-01 PROCEDURE — 99213 OFFICE O/P EST LOW 20 MIN: CPT | Performed by: STUDENT IN AN ORGANIZED HEALTH CARE EDUCATION/TRAINING PROGRAM

## 2022-11-14 ENCOUNTER — OFFICE VISIT (OUTPATIENT)
Dept: FAMILY MEDICINE | Facility: HOSPITAL | Age: 62
End: 2022-11-14
Payer: COMMERCIAL

## 2022-11-14 VITALS
HEIGHT: 71 IN | HEART RATE: 76 BPM | DIASTOLIC BLOOD PRESSURE: 80 MMHG | SYSTOLIC BLOOD PRESSURE: 119 MMHG | BODY MASS INDEX: 25.31 KG/M2 | WEIGHT: 180.75 LBS

## 2022-11-14 DIAGNOSIS — E66.3 OVERWEIGHT (BMI 25.0-29.9): ICD-10-CM

## 2022-11-14 DIAGNOSIS — R53.81 PHYSICAL DECONDITIONING: ICD-10-CM

## 2022-11-14 DIAGNOSIS — Z12.11 ENCOUNTER FOR SCREENING FOR MALIGNANT NEOPLASM OF COLON: ICD-10-CM

## 2022-11-14 DIAGNOSIS — R42 LIGHTHEADEDNESS: Primary | ICD-10-CM

## 2022-11-14 PROCEDURE — 99213 OFFICE O/P EST LOW 20 MIN: CPT | Performed by: STUDENT IN AN ORGANIZED HEALTH CARE EDUCATION/TRAINING PROGRAM

## 2022-11-14 NOTE — PROGRESS NOTES
"  Progress Note  [facility]    Subjective:      Aureliano Wilson is a 62 y.o. male here     # lightheadedness/dizziness:  Patient presents complaining of 2-3 months of lightheadedness and dizziness when he is walking; patient denies syncope, chest pain, incontinence, sensation of room swelling, palpitations, tremors, nausea/vomiting, or shortness of breath; patient says he drinks approximately 1 L of water per day; patient had a stress test back in 2019 presumably ordered after finding PVCs on his EKG - patient was unable to complete the stress test due to dyspnea; patient did have an echo done at that time showing EF of 55% without any obvious dysfunction; EKG done 1 year later showing normal sinus rhythm; patient admits to not exercising and sitting at his desk all day, though patient states he used to be active in boxing and swimming    Review of Systems   Constitutional:  Negative for chills and fever.   Respiratory:  Negative for cough and shortness of breath.    Cardiovascular:  Negative for chest pain and leg swelling.   Gastrointestinal:  Negative for abdominal pain, constipation, diarrhea, nausea and vomiting.   Genitourinary:  Negative for dysuria.   Musculoskeletal:  Negative for myalgias.   Neurological:  Positive for dizziness (occasional).       Objective:   /80   Pulse 76   Ht 5' 11" (1.803 m)   Wt 82 kg (180 lb 12.4 oz)   BMI 25.21 kg/m²      Physical Exam  Vitals reviewed.   Constitutional:       General: He is not in acute distress.     Appearance: Normal appearance. He is not ill-appearing, toxic-appearing or diaphoretic.   HENT:      Head: Normocephalic and atraumatic.      Right Ear: External ear normal.      Left Ear: External ear normal.      Nose: Nose normal.      Mouth/Throat:      Mouth: Mucous membranes are moist.   Eyes:      Extraocular Movements: Extraocular movements intact.   Cardiovascular:      Rate and Rhythm: Normal rate.      Pulses: Normal pulses.   Pulmonary: "      Effort: Pulmonary effort is normal. No respiratory distress.   Abdominal:      Tenderness: There is no guarding.   Musculoskeletal:         General: Normal range of motion.      Cervical back: Normal range of motion.   Skin:     General: Skin is warm.      Capillary Refill: Capillary refill takes less than 2 seconds.   Neurological:      Mental Status: He is alert and oriented to person, place, and time.   Psychiatric:         Mood and Affect: Mood normal.         Behavior: Behavior normal.        Assessment:   62 y.o. with        1. Lightheadedness    2. Overweight (BMI 25.0-29.9)    3. Encounter for screening for malignant neoplasm of colon    4. Physical deconditioning         Plan:   Aureliano was seen today for follow-up.    Diagnoses and all orders for this visit:    Lightheadedness  -     CBC Auto Differential; Future  -     Comprehensive Metabolic Panel; Future  -     Lipid Panel; Future  -     Hemoglobin A1C; Future  -     EKG 12-lead; Future  -     TSH; Future    Overweight (BMI 25.0-29.9)    Encounter for screening for malignant neoplasm of colon  -     Case Request Endoscopy: COLONOSCOPY    Physical deconditioning  -advised patient to increase fluid intake to 64 oz of water per day; advised patient to begin exercising with goal of 30 minutes aerobic exercise 5 days a week; will order EKG and blood test as well today; overall, patient's dizziness with no clear neurologic, cardiologic, or fluid balance causes; patient with evidence of deconditioning as seen by him failing his stress test in 2019 due to not being able last on treadmill for the required 3 minutes, patient states he is not done any exercising since then; at this time, presumptive diagnosis is dizziness due to deconditioning, but will rule out other causes with the aforementioned tests; patient to follow-up in 1 month for checkup    Follow up in about 1 month (around 12/14/2022) for check up.    Enrique Mejia DO  [facility],  PGY-3  5:06 PM, 11/14/2022    *This note was dictated using the M*Modal Fluency Direct word recognition program. There are word rescognition mistakes that are occasionally missed on review.     The following information is provided to all patients.  This information is to help you find resources for any of the problems found today that may be affecting your health:                Living healthy guide: [email or web address].[email or web address].[email or web address]       Understanding Diabetes: www.diabetes.org       Eating healthy: www.cdc.gov/healthyweight      [facility] home safety checklist: www.cdc.gov/steadi/patient.html      Agency on Aging: www.goea.[place].[facility]       Alcoholics anonymous (AA): www.aa.org      Physical Activity: www.nancy.nih.gov/vb8udzv       Tobacco use: www.quitwithusla.org

## 2022-12-01 ENCOUNTER — LAB VISIT (OUTPATIENT)
Dept: LAB | Facility: HOSPITAL | Age: 62
End: 2022-12-01
Payer: COMMERCIAL

## 2022-12-01 ENCOUNTER — CLINICAL SUPPORT (OUTPATIENT)
Dept: LAB | Facility: HOSPITAL | Age: 62
End: 2022-12-01
Payer: COMMERCIAL

## 2022-12-01 DIAGNOSIS — R42 LIGHTHEADEDNESS: ICD-10-CM

## 2022-12-01 LAB
ALBUMIN SERPL BCP-MCNC: 3.4 G/DL (ref 3.5–5.2)
ALP SERPL-CCNC: 62 U/L (ref 55–135)
ALT SERPL W/O P-5'-P-CCNC: 15 U/L (ref 10–44)
ANION GAP SERPL CALC-SCNC: 7 MMOL/L (ref 8–16)
AST SERPL-CCNC: 14 U/L (ref 10–40)
BASOPHILS # BLD AUTO: 0.06 K/UL (ref 0–0.2)
BASOPHILS NFR BLD: 1 % (ref 0–1.9)
BILIRUB SERPL-MCNC: 0.4 MG/DL (ref 0.1–1)
BUN SERPL-MCNC: 9 MG/DL (ref 8–23)
CALCIUM SERPL-MCNC: 8.9 MG/DL (ref 8.7–10.5)
CHLORIDE SERPL-SCNC: 106 MMOL/L (ref 95–110)
CHOLEST SERPL-MCNC: 231 MG/DL (ref 120–199)
CHOLEST/HDLC SERPL: 3.9 {RATIO} (ref 2–5)
CO2 SERPL-SCNC: 26 MMOL/L (ref 23–29)
CREAT SERPL-MCNC: 0.8 MG/DL (ref 0.5–1.4)
DIFFERENTIAL METHOD: ABNORMAL
EOSINOPHIL # BLD AUTO: 0.8 K/UL (ref 0–0.5)
EOSINOPHIL NFR BLD: 13.2 % (ref 0–8)
ERYTHROCYTE [DISTWIDTH] IN BLOOD BY AUTOMATED COUNT: 13 % (ref 11.5–14.5)
EST. GFR  (NO RACE VARIABLE): >60 ML/MIN/1.73 M^2
ESTIMATED AVG GLUCOSE: 105 MG/DL (ref 68–131)
GLUCOSE SERPL-MCNC: 88 MG/DL (ref 70–110)
HBA1C MFR BLD: 5.3 % (ref 4–5.6)
HCT VFR BLD AUTO: 42.1 % (ref 40–54)
HDLC SERPL-MCNC: 59 MG/DL (ref 40–75)
HDLC SERPL: 25.5 % (ref 20–50)
HGB BLD-MCNC: 13.7 G/DL (ref 14–18)
IMM GRANULOCYTES # BLD AUTO: 0.02 K/UL (ref 0–0.04)
IMM GRANULOCYTES NFR BLD AUTO: 0.3 % (ref 0–0.5)
LDLC SERPL CALC-MCNC: 154.6 MG/DL (ref 63–159)
LYMPHOCYTES # BLD AUTO: 1.2 K/UL (ref 1–4.8)
LYMPHOCYTES NFR BLD: 19.6 % (ref 18–48)
MCH RBC QN AUTO: 28.6 PG (ref 27–31)
MCHC RBC AUTO-ENTMCNC: 32.5 G/DL (ref 32–36)
MCV RBC AUTO: 88 FL (ref 82–98)
MONOCYTES # BLD AUTO: 0.8 K/UL (ref 0.3–1)
MONOCYTES NFR BLD: 13.2 % (ref 4–15)
NEUTROPHILS # BLD AUTO: 3.2 K/UL (ref 1.8–7.7)
NEUTROPHILS NFR BLD: 52.7 % (ref 38–73)
NONHDLC SERPL-MCNC: 172 MG/DL
NRBC BLD-RTO: 0 /100 WBC
PLATELET # BLD AUTO: 234 K/UL (ref 150–450)
PMV BLD AUTO: 9.2 FL (ref 9.2–12.9)
POTASSIUM SERPL-SCNC: 4 MMOL/L (ref 3.5–5.1)
PROT SERPL-MCNC: 6.6 G/DL (ref 6–8.4)
RBC # BLD AUTO: 4.79 M/UL (ref 4.6–6.2)
SODIUM SERPL-SCNC: 139 MMOL/L (ref 136–145)
TRIGL SERPL-MCNC: 87 MG/DL (ref 30–150)
TSH SERPL DL<=0.005 MIU/L-ACNC: 1.43 UIU/ML (ref 0.4–4)
WBC # BLD AUTO: 6.06 K/UL (ref 3.9–12.7)

## 2022-12-01 PROCEDURE — 93010 EKG 12-LEAD: ICD-10-PCS | Mod: ,,, | Performed by: INTERNAL MEDICINE

## 2022-12-01 PROCEDURE — 93010 ELECTROCARDIOGRAM REPORT: CPT | Mod: ,,, | Performed by: INTERNAL MEDICINE

## 2022-12-01 PROCEDURE — 84443 ASSAY THYROID STIM HORMONE: CPT | Performed by: STUDENT IN AN ORGANIZED HEALTH CARE EDUCATION/TRAINING PROGRAM

## 2022-12-01 PROCEDURE — 85025 COMPLETE CBC W/AUTO DIFF WBC: CPT | Performed by: STUDENT IN AN ORGANIZED HEALTH CARE EDUCATION/TRAINING PROGRAM

## 2022-12-01 PROCEDURE — 36415 COLL VENOUS BLD VENIPUNCTURE: CPT | Performed by: STUDENT IN AN ORGANIZED HEALTH CARE EDUCATION/TRAINING PROGRAM

## 2022-12-01 PROCEDURE — 83036 HEMOGLOBIN GLYCOSYLATED A1C: CPT | Performed by: STUDENT IN AN ORGANIZED HEALTH CARE EDUCATION/TRAINING PROGRAM

## 2022-12-01 PROCEDURE — 80053 COMPREHEN METABOLIC PANEL: CPT | Performed by: STUDENT IN AN ORGANIZED HEALTH CARE EDUCATION/TRAINING PROGRAM

## 2022-12-01 PROCEDURE — 93005 ELECTROCARDIOGRAM TRACING: CPT

## 2022-12-01 PROCEDURE — 80061 LIPID PANEL: CPT | Performed by: STUDENT IN AN ORGANIZED HEALTH CARE EDUCATION/TRAINING PROGRAM

## 2022-12-08 ENCOUNTER — OFFICE VISIT (OUTPATIENT)
Dept: FAMILY MEDICINE | Facility: HOSPITAL | Age: 62
End: 2022-12-08
Payer: COMMERCIAL

## 2022-12-08 VITALS
WEIGHT: 184.06 LBS | HEART RATE: 72 BPM | DIASTOLIC BLOOD PRESSURE: 82 MMHG | BODY MASS INDEX: 25.77 KG/M2 | HEIGHT: 71 IN | SYSTOLIC BLOOD PRESSURE: 129 MMHG

## 2022-12-08 DIAGNOSIS — E78.2 MIXED HYPERLIPIDEMIA: Primary | ICD-10-CM

## 2022-12-08 PROCEDURE — 99213 OFFICE O/P EST LOW 20 MIN: CPT | Performed by: STUDENT IN AN ORGANIZED HEALTH CARE EDUCATION/TRAINING PROGRAM

## 2022-12-08 RX ORDER — ATORVASTATIN CALCIUM 40 MG/1
40 TABLET, FILM COATED ORAL NIGHTLY
Qty: 90 TABLET | Refills: 3 | Status: SHIPPED | OUTPATIENT
Start: 2022-12-08 | End: 2023-07-17

## 2022-12-12 NOTE — PROGRESS NOTES
I assume primary medical responsibility for this patient. I have reviewed the history, physical, and assessement & treatment plan with the resident and agree that the care is reasonable and necessary. This service has been performed by a resident without the presence of a teaching physician under the primary care exception. If necessary, an addendum of additional findings or evaluation beyond the resident documentation will be noted below.      Danielle Jones MD    [facility]

## 2023-07-13 ENCOUNTER — OFFICE VISIT (OUTPATIENT)
Dept: URGENT CARE | Facility: CLINIC | Age: 63
End: 2023-07-13
Payer: COMMERCIAL

## 2023-07-13 VITALS
HEART RATE: 84 BPM | DIASTOLIC BLOOD PRESSURE: 71 MMHG | WEIGHT: 184 LBS | RESPIRATION RATE: 18 BRPM | BODY MASS INDEX: 25.76 KG/M2 | SYSTOLIC BLOOD PRESSURE: 135 MMHG | HEIGHT: 71 IN | OXYGEN SATURATION: 96 % | TEMPERATURE: 99 F

## 2023-07-13 DIAGNOSIS — R56.9 PARTIAL SEIZURES: Primary | ICD-10-CM

## 2023-07-13 LAB
ALBUMIN SERPL BCP-MCNC: 3.8 G/DL (ref 3.5–5.2)
ALP SERPL-CCNC: 58 U/L (ref 55–135)
ALT SERPL W/O P-5'-P-CCNC: 13 U/L (ref 10–44)
ANION GAP SERPL CALC-SCNC: 7 MMOL/L (ref 8–16)
AST SERPL-CCNC: 15 U/L (ref 10–40)
BASOPHILS # BLD AUTO: 0.06 K/UL (ref 0–0.2)
BASOPHILS NFR BLD: 1 % (ref 0–1.9)
BILIRUB SERPL-MCNC: 0.5 MG/DL (ref 0.1–1)
BUN SERPL-MCNC: 13 MG/DL (ref 8–23)
CALCIUM SERPL-MCNC: 9.1 MG/DL (ref 8.7–10.5)
CHLORIDE SERPL-SCNC: 108 MMOL/L (ref 95–110)
CO2 SERPL-SCNC: 27 MMOL/L (ref 23–29)
CREAT SERPL-MCNC: 0.9 MG/DL (ref 0.5–1.4)
DIFFERENTIAL METHOD: ABNORMAL
EOSINOPHIL # BLD AUTO: 0.4 K/UL (ref 0–0.5)
EOSINOPHIL NFR BLD: 6.8 % (ref 0–8)
ERYTHROCYTE [DISTWIDTH] IN BLOOD BY AUTOMATED COUNT: 13 % (ref 11.5–14.5)
EST. GFR  (NO RACE VARIABLE): >60 ML/MIN/1.73 M^2
GLUCOSE SERPL-MCNC: 114 MG/DL (ref 70–110)
HCT VFR BLD AUTO: 42.3 % (ref 40–54)
HGB BLD-MCNC: 14 G/DL (ref 14–18)
IMM GRANULOCYTES # BLD AUTO: 0.01 K/UL (ref 0–0.04)
IMM GRANULOCYTES NFR BLD AUTO: 0.2 % (ref 0–0.5)
LYMPHOCYTES # BLD AUTO: 0.9 K/UL (ref 1–4.8)
LYMPHOCYTES NFR BLD: 13.7 % (ref 18–48)
MCH RBC QN AUTO: 28.8 PG (ref 27–31)
MCHC RBC AUTO-ENTMCNC: 33.1 G/DL (ref 32–36)
MCV RBC AUTO: 87 FL (ref 82–98)
MONOCYTES # BLD AUTO: 0.7 K/UL (ref 0.3–1)
MONOCYTES NFR BLD: 10.5 % (ref 4–15)
NEUTROPHILS # BLD AUTO: 4.2 K/UL (ref 1.8–7.7)
NEUTROPHILS NFR BLD: 67.8 % (ref 38–73)
NRBC BLD-RTO: 0 /100 WBC
PLATELET # BLD AUTO: 116 K/UL (ref 150–450)
PMV BLD AUTO: 12.6 FL (ref 9.2–12.9)
POTASSIUM SERPL-SCNC: 3.9 MMOL/L (ref 3.5–5.1)
PROT SERPL-MCNC: 6.3 G/DL (ref 6–8.4)
RBC # BLD AUTO: 4.86 M/UL (ref 4.6–6.2)
SODIUM SERPL-SCNC: 142 MMOL/L (ref 136–145)
WBC # BLD AUTO: 6.21 K/UL (ref 3.9–12.7)

## 2023-07-13 PROCEDURE — 99204 PR OFFICE/OUTPT VISIT, NEW, LEVL IV, 45-59 MIN: ICD-10-PCS | Mod: S$GLB,,, | Performed by: PHYSICIAN ASSISTANT

## 2023-07-13 PROCEDURE — 85025 COMPLETE CBC W/AUTO DIFF WBC: CPT | Performed by: PHYSICIAN ASSISTANT

## 2023-07-13 PROCEDURE — 36415 COLL VENOUS BLD VENIPUNCTURE: CPT | Performed by: PHYSICIAN ASSISTANT

## 2023-07-13 PROCEDURE — 80053 COMPREHEN METABOLIC PANEL: CPT | Performed by: PHYSICIAN ASSISTANT

## 2023-07-13 PROCEDURE — 99204 OFFICE O/P NEW MOD 45 MIN: CPT | Mod: S$GLB,,, | Performed by: PHYSICIAN ASSISTANT

## 2023-07-13 RX ORDER — LORAZEPAM 1 MG/1
1 TABLET ORAL ONCE
Qty: 1 TABLET | Refills: 0 | Status: SHIPPED | OUTPATIENT
Start: 2023-07-13 | End: 2023-07-17 | Stop reason: SDUPTHER

## 2023-07-13 RX ORDER — LEVETIRACETAM 500 MG/1
500 TABLET ORAL 2 TIMES DAILY
Qty: 60 TABLET | Refills: 0 | Status: SHIPPED | OUTPATIENT
Start: 2023-07-13 | End: 2023-08-10 | Stop reason: SDUPTHER

## 2023-07-13 NOTE — PATIENT INSTRUCTIONS
You are exhibiting symptoms that resemble a partial seizure.  Start Keppra immediately take twice a day, I have given you a month's worth.  You need to follow up with your primary doctor immediately, I will send my note to them to inform them.  No driving until cleared by primary care or Neurology.  I have placed a neurology referral, [facility] and they have placed an urgent message to their office and they should contact you in 24 hours.  I have also ordered a CT of the head, this has been set up for tomorrow at 6:45 a.m. at the [facility] [address] [place] [address].  This is across the street from the [facility].  You should be fasting 4 hours prior to the CT.  Due to her claustrophobia I have also sent Ativan 1 mg take 1 tablet 30 minutes prior to CT.  Blood work was drawn today we will follow up with results once available.  Should your symptoms worsen or persist please go to the ER for evaluation.

## 2023-07-13 NOTE — PROGRESS NOTES
"Subjective:      Patient ID: Aureliano Wilson is a 63 y.o. male.    Vitals:  height is 5' 11" (1.803 m) and weight is 83.5 kg (184 lb). His oral temperature is 98.7 °F (37.1 °C). His blood pressure is 135/71 and his pulse is 84. His respiration is 18 and oxygen saturation is 96%.     Chief Complaint: Hand Pain (Left side Leg pain)    63 yr male present with left hand and leg pain . Pt states that both  area having severe cramping. The hand and foot locks up and makes the wrist and toes curl. Onset Monday  Treatments at home include Massaging area . Pt states that he looses all motor function in the hand and foot.  Patient states that the incident has only happened twice since Monday with the last 1 being few hours ago.  Patient states that the left arm and left leg both cramp up at the same time for about 5-10 minutes..  Patient denies any vision changes, headaches, dizziness, slurred speech, difficulty swallowing, chest pain, shortness O breath, abdominal pain, bladder or bowel incontinence or loss of consciousness.  Patient denies any trauma and does not drink alcohol or do drugs.    Hand Pain   The incident occurred 3 to 5 days ago. The incident occurred at home. There was no injury mechanism. The pain is present in the left hand and left wrist. The quality of the pain is described as cramping. The pain does not radiate. The pain has been Fluctuating since the incident. Pertinent negatives include no chest pain or numbness. Nothing aggravates the symptoms. He has tried nothing for the symptoms. The treatment provided no relief.     Constitution: Negative for appetite change, chills, sweating, fatigue, fever and generalized weakness.   HENT:  Negative for ear pain, ear discharge, tinnitus, hearing loss, dental problem, drooling, mouth sores, tongue pain, tongue lesion, facial swelling, congestion, postnasal drip, sinus pain, sinus pressure, sore throat, trouble swallowing and voice change.    Neck: Negative for " neck pain, neck stiffness and painful lymph nodes.   Cardiovascular:  Negative for chest pain, leg swelling, palpitations, sob on exertion and passing out.   Eyes:  Negative for eye discharge, eye itching, eye pain, eye redness, photophobia, vision loss, double vision and blurred vision.   Respiratory:  Negative for cough, shortness of breath and wheezing.    Gastrointestinal:  Negative for abdominal pain, nausea, vomiting, constipation, diarrhea, heartburn and bowel incontinence.   Musculoskeletal:  Positive for muscle cramps. Negative for pain, trauma, joint pain, joint swelling, back pain, pain with walking and muscle ache.   Skin:  Negative for color change, pale, rash and erythema.   Neurological:  Positive for seizures. Negative for dizziness, light-headedness, passing out, facial drooping, speech difficulty, coordination disturbances, loss of balance, headaches, disorientation, altered mental status, loss of consciousness, numbness, tingling and tremors.   Hematologic/Lymphatic: Negative for swollen lymph nodes.   Psychiatric/Behavioral:  Negative for altered mental status, disorientation, confusion, agitation, nervous/anxious and sleep disturbance. The patient is not nervous/anxious.    Past Medical History:   Diagnosis Date    Erectile dysfunction        Past Surgical History:   Procedure Laterality Date    COLONOSCOPY N/A 6/5/2017    Procedure: COLONOSCOPY;  Surgeon: Tung Cochran MD;  Location: [facility];  Service: Endoscopy;  Laterality: N/A;       Family History   Problem Relation Age of Onset    Diabetes Father     Stroke Father     Diabetes Mother        Social History     Socioeconomic History    Marital status:    Tobacco Use    Smoking status: Every Day     Packs/day: 0.25     Years: 30.00     Pack years: 7.50     Types: Cigarettes    Smokeless tobacco: Current    Tobacco comments:     6 cigarettes a day   Substance and Sexual Activity    Alcohol use: Yes     Alcohol/week: 2.0 standard  drinks     Types: 2 Glasses of wine per week     Comment: socially    Drug use: No    Sexual activity: Yes     Partners: Female       Current Outpatient Medications   Medication Sig Dispense Refill    atorvastatin (LIPITOR) 40 MG tablet Take 1 tablet (40 mg total) by mouth every evening. 90 tablet 3    levETIRAcetam (KEPPRA) 500 MG Tab Take 1 tablet (500 mg total) by mouth 2 (two) times daily. 60 tablet 0    LORazepam (ATIVAN) 1 MG tablet Take 1 tablet (1 mg total) by mouth once. for 1 dose 1 tablet 0     No current facility-administered medications for this visit.       Review of patient's allergies indicates:  No Known Allergies     Objective:     Physical Exam   Constitutional: He is oriented to person, place, and time. He appears well-developed. He is cooperative.  Non-toxic appearance. He does not appear ill. No distress. normal  HENT:   Head: Normocephalic and atraumatic.   Ears:   Right Ear: Hearing, tympanic membrane, external ear and ear canal normal. impacted cerumen  Left Ear: Hearing, tympanic membrane, external ear and ear canal normal. impacted cerumen  Nose: Nose normal. No mucosal edema, rhinorrhea, nasal deformity or congestion. No epistaxis. Right sinus exhibits no maxillary sinus tenderness and no frontal sinus tenderness. Left sinus exhibits no maxillary sinus tenderness and no frontal sinus tenderness.   Mouth/Throat: Uvula is midline, oropharynx is clear and moist and mucous membranes are normal. No trismus in the jaw. Normal dentition. No uvula swelling. No oropharyngeal exudate, posterior oropharyngeal edema or posterior oropharyngeal erythema.   Eyes: Right eye visual fields normal and left eye visual fields normal. Conjunctivae and lids are normal. Pupils are equal, round, and reactive to light. No visual field deficit is present. Right eye exhibits no discharge and no hordeolum. No foreign body present in the right eye. Left eye exhibits no discharge and no hordeolum. No foreign body  present in the left eye. No scleral icterus. Right pupil is round, reactive and not sluggish. Left pupil is round, reactive and not sluggish.   Fundoscopic exam:       The right eye shows no arteriolar narrowing, no AV nicking, no exudate, no hemorrhage and no papilledema. The right eye shows red reflex present and venous pulsations present.        The left eye shows no arteriolar narrowing, no AV nicking, no exudate, no hemorrhage and no papilledema. The left eye shows red reflex present and venous pulsations present. Extraocular movement intact vision grossly intact gaze aligned appropriately periorbital hyperpigmentation  Neck: Trachea normal and phonation normal. Neck supple. No edema present. No erythema present. No neck rigidity present.   Cardiovascular: Normal rate, regular rhythm, normal heart sounds and normal pulses.   No murmur heard.Exam reveals no gallop.   Pulmonary/Chest: Effort normal and breath sounds normal. No stridor. No respiratory distress. He has no decreased breath sounds. He has no wheezes. He has no rhonchi. He has no rales.   Abdominal: Normal appearance. Soft. flat abdomen There is no abdominal tenderness.   Musculoskeletal: Normal range of motion.         General: No swelling, tenderness, deformity or signs of injury. Normal range of motion.      Right shoulder: Normal.      Left shoulder: Normal.      Right elbow: Normal.     Left elbow: Normal.      Right wrist: Normal.      Left wrist: Normal.      Right hip: Normal.      Left hip: Normal.      Right knee: Normal.      Left knee: Normal.      Right ankle: Normal.      Left ankle: Normal.      Cervical back: He exhibits no tenderness.      Thoracic back: Normal.      Lumbar back: Normal.      Right upper arm: Normal.      Left upper arm: Normal.      Right forearm: Normal.      Left forearm: Normal.      Right hand: Normal.      Left hand: Normal.      Right upper leg: Normal.      Left upper leg: Normal.      Right lower leg: Normal.       Left lower leg: Normal.      Right foot: Normal.      Left foot: Normal.   Lymphadenopathy:     He has no cervical adenopathy.   Neurological: no focal deficit. He is alert and oriented to person, place, and time. He has normal motor skills and normal sensation. He displays no weakness, no atrophy, no tremor, facial symmetry and no dysarthria. No cranial nerve deficit or sensory deficit. He exhibits normal muscle tone. Coordination: Romberg sign negative and Heel to shin test normal. He shows no pronator drift. He displays no seizure activity. Gait and coordination normal. Coordination and gait normal. GCS eye subscore is 4. GCS verbal subscore is 5. GCS motor subscore is 6.   Skin: Skin is warm, dry, intact, not diaphoretic, not pale and no rash. No bruising and No erythema   Psychiatric: His speech is normal and behavior is normal. Mood, judgment and thought content normal.   Nursing note and vitals reviewed.    Assessment:     1. Partial seizures        Plan:       Partial seizures  -     CBC W/ AUTO DIFFERENTIAL  -     COMPREHENSIVE METABOLIC PANEL  -     Ambulatory referral/consult to Neurology  -     levETIRAcetam (KEPPRA) 500 MG Tab; Take 1 tablet (500 mg total) by mouth 2 (two) times daily.  Dispense: 60 tablet; Refill: 0  -     CT Head W Wo Contrast; Future; Expected date: 07/13/2023  -     LORazepam (ATIVAN) 1 MG tablet; Take 1 tablet (1 mg total) by mouth once. for 1 dose  Dispense: 1 tablet; Refill: 0    Patient did not wish to go to the ER.  Discussed with Dr. Fuentes who agreed the patient should be placed on keppra, advised how to take the mediation and possible s/e. Attempted to get pt a neuro appt however  states they have to place a message to neuro office. Pt will f/u immediately with PCP and I will send my note to them. Pt advised not to drive until cleared by Neuro and is on strict ER precautions. Will f/u with labs. CT ordered and scheduled for tomorrow morning, pt given details. Pt  states that he gets anxiety attacks with enclosed spaces and requested medication to help calm his nerves. Given 1 tablet of ativan advised on how to use.     Medical Decision Making:   Initial Assessment:   Partial seizures  Differential Diagnosis:   CVA, TIA, seizures, electrolyte abnormalities  Clinical Tests:   Lab Tests: Ordered  Radiological Study: Ordered  Urgent Care Management:  Keppra, CT head with and w/o contrast, ativan, neurology referral   Other:   I have discussed this case with another health care provider.       <> Summary of the Discussion: Dr. Makenna Fuentes        Patient Instructions   You are exhibiting symptoms that resemble a partial seizure.  Start Keppra immediately take twice a day, I have given you a month's worth.  You need to follow up with your primary doctor immediately, I will send my note to them to inform them.  No driving until cleared by primary care or Neurology.  I have placed a neurology referral, I called the [facility] service and they have placed an urgent message to their office and they should contact you in 24 hours.  I have also ordered a CT of the head, this has been set up for tomorrow at 6:45 a.m. at the [facility] [address] [place] [address].  This is across the street from the [facility].  You should be fasting 4 hours prior to the CT.  Due to her claustrophobia I have also sent Ativan 1 mg take 1 tablet 30 minutes prior to CT.  Blood work was drawn today we will follow up with results once available.  Should your symptoms worsen or persist please go to the ER for evaluation.

## 2023-07-14 ENCOUNTER — HOSPITAL ENCOUNTER (OUTPATIENT)
Dept: RADIOLOGY | Facility: HOSPITAL | Age: 63
Discharge: HOME | End: 2023-07-14
Attending: PHYSICIAN ASSISTANT
Payer: COMMERCIAL

## 2023-07-14 ENCOUNTER — TELEPHONE (OUTPATIENT)
Dept: URGENT CARE | Facility: CLINIC | Age: 63
End: 2023-07-14
Payer: COMMERCIAL

## 2023-07-14 DIAGNOSIS — R56.9 PARTIAL SEIZURES: ICD-10-CM

## 2023-07-14 PROCEDURE — 25500020 PHARM REV CODE 255: Performed by: PHYSICIAN ASSISTANT

## 2023-07-14 PROCEDURE — 70470 CT HEAD WITH AND WITHOUT: ICD-10-PCS | Mod: 26,,, | Performed by: RADIOLOGY

## 2023-07-14 PROCEDURE — 70470 CT HEAD/BRAIN W/O & W/DYE: CPT | Mod: TC

## 2023-07-14 PROCEDURE — 70470 CT HEAD/BRAIN W/O & W/DYE: CPT | Mod: 26,,, | Performed by: RADIOLOGY

## 2023-07-14 RX ADMIN — IOHEXOL 75 ML: 350 INJECTION, SOLUTION INTRAVENOUS at 06:07

## 2023-07-14 NOTE — TELEPHONE ENCOUNTER
ct and bloodwork reviewed with patient, he has not yet started keppra, pt advised to reach out to pcp today and make f/u appt and advised neurology should reach out to them soon

## 2023-07-17 ENCOUNTER — LAB VISIT (OUTPATIENT)
Dept: LAB | Facility: HOSPITAL | Age: 63
End: 2023-07-17
Payer: COMMERCIAL

## 2023-07-17 ENCOUNTER — TELEPHONE (OUTPATIENT)
Dept: NEUROLOGY | Facility: CLINIC | Age: 63
End: 2023-07-17
Payer: COMMERCIAL

## 2023-07-17 ENCOUNTER — OFFICE VISIT (OUTPATIENT)
Dept: FAMILY MEDICINE | Facility: HOSPITAL | Age: 63
End: 2023-07-17
Payer: COMMERCIAL

## 2023-07-17 VITALS — WEIGHT: 183.19 LBS | HEIGHT: 71 IN | BODY MASS INDEX: 25.65 KG/M2

## 2023-07-17 DIAGNOSIS — R56.9 PARTIAL SEIZURE: ICD-10-CM

## 2023-07-17 DIAGNOSIS — E78.2 MIXED HYPERLIPIDEMIA: ICD-10-CM

## 2023-07-17 DIAGNOSIS — R56.9 PARTIAL SEIZURE: Primary | ICD-10-CM

## 2023-07-17 DIAGNOSIS — R56.9 PARTIAL SEIZURES: ICD-10-CM

## 2023-07-17 LAB
FOLATE SERPL-MCNC: 6.5 NG/ML (ref 4–24)
HIV 1+2 AB+HIV1 P24 AG SERPL QL IA: NORMAL
TSH SERPL DL<=0.005 MIU/L-ACNC: 2.48 UIU/ML (ref 0.4–4)
VIT B12 SERPL-MCNC: 337 PG/ML (ref 210–950)

## 2023-07-17 PROCEDURE — 86592 SYPHILIS TEST NON-TREP QUAL: CPT | Performed by: STUDENT IN AN ORGANIZED HEALTH CARE EDUCATION/TRAINING PROGRAM

## 2023-07-17 PROCEDURE — 82746 ASSAY OF FOLIC ACID SERUM: CPT | Performed by: STUDENT IN AN ORGANIZED HEALTH CARE EDUCATION/TRAINING PROGRAM

## 2023-07-17 PROCEDURE — 82607 VITAMIN B-12: CPT | Performed by: STUDENT IN AN ORGANIZED HEALTH CARE EDUCATION/TRAINING PROGRAM

## 2023-07-17 PROCEDURE — 99213 OFFICE O/P EST LOW 20 MIN: CPT | Performed by: STUDENT IN AN ORGANIZED HEALTH CARE EDUCATION/TRAINING PROGRAM

## 2023-07-17 PROCEDURE — 84443 ASSAY THYROID STIM HORMONE: CPT | Performed by: STUDENT IN AN ORGANIZED HEALTH CARE EDUCATION/TRAINING PROGRAM

## 2023-07-17 PROCEDURE — 87389 HIV-1 AG W/HIV-1&-2 AB AG IA: CPT | Performed by: STUDENT IN AN ORGANIZED HEALTH CARE EDUCATION/TRAINING PROGRAM

## 2023-07-17 PROCEDURE — 36415 COLL VENOUS BLD VENIPUNCTURE: CPT | Performed by: STUDENT IN AN ORGANIZED HEALTH CARE EDUCATION/TRAINING PROGRAM

## 2023-07-17 RX ORDER — ATORVASTATIN CALCIUM 40 MG/1
40 TABLET, FILM COATED ORAL NIGHTLY
Qty: 90 TABLET | Refills: 3 | Status: SHIPPED | OUTPATIENT
Start: 2023-07-17 | End: 2023-07-17

## 2023-07-17 RX ORDER — LORAZEPAM 1 MG/1
1 TABLET ORAL ONCE
Qty: 1 TABLET | Refills: 0 | Status: SHIPPED | OUTPATIENT
Start: 2023-07-17 | End: 2024-03-01

## 2023-07-17 RX ORDER — ATORVASTATIN CALCIUM 40 MG/1
40 TABLET, FILM COATED ORAL NIGHTLY
Qty: 90 TABLET | Refills: 3 | Status: SHIPPED | OUTPATIENT
Start: 2023-07-17 | End: 2024-03-01

## 2023-07-17 NOTE — PROGRESS NOTES
PROGRESS NOTE  [facility]    Subjective:       Patient ID: Aureliano Wilson is a 63 y.o. male.    Chief Complaint: Seizures      63-year-old male with no significant past medical history who presents to clinic for follow-up on a recent urgent care visit for potential partial seizure.  Has had 2 episodes of left arm flexion left leg extension 1st on Monday of last week and then most recently Thursday of last week lasting for approximately 5 minutes each time causing patient to fall.  Patient denies hitting his head, felt safely into couch.  Denies any confusion, urinary incontinence, bowel incontinence, confusion, headache, loss of consciousness.  Denies any recent changes in his diet, health status, denies any recreational drug use.  Urged receive referral to Neurology from urgent Care, CMP in head CT done after urgent Care were both within normal limits.  Will order MRI, other labs to rule out other infectious or metabolic sources.  Patient appears stable and well at this time.  Taking Keppra without issue.    Review of Systems   Constitutional:  Negative for activity change, appetite change, chills and unexpected weight change.   HENT:  Negative for congestion, ear pain, facial swelling, hearing loss and rhinorrhea.    Eyes:  Negative for discharge and redness.   Respiratory:  Negative for cough and shortness of breath.    Cardiovascular:  Negative for chest pain and leg swelling.   Gastrointestinal:  Negative for abdominal distention and abdominal pain.   Genitourinary:  Negative for dysuria and flank pain.   Musculoskeletal: Negative.  Negative for back pain and myalgias.   Skin: Negative.    Neurological:  Positive for seizures. Negative for facial asymmetry, speech difficulty, weakness and headaches.   Psychiatric/Behavioral:  Negative for agitation and confusion.      Objective:      There were no vitals filed for this visit.  Body mass index is 25.55 kg/m².  Physical Exam  Vitals and nursing note  reviewed.   Constitutional:       Appearance: Normal appearance.   HENT:      Head: Normocephalic and atraumatic.   Eyes:      Pupils: Pupils are equal, round, and reactive to light.   Cardiovascular:      Rate and Rhythm: Normal rate and regular rhythm.      Heart sounds: Normal heart sounds.   Pulmonary:      Breath sounds: Normal breath sounds.   Abdominal:      General: Abdomen is flat.      Palpations: Abdomen is soft.   Musculoskeletal:         General: No swelling or tenderness.      Cervical back: Normal range of motion.   Skin:     General: Skin is warm.   Neurological:      General: No focal deficit present.      Mental Status: He is alert.   Psychiatric:         Mood and Affect: Mood normal.       Assessment:       1. Partial seizure    2. Mixed hyperlipidemia    3. Partial seizures        63-year-old man with above past medical history here for potential partial seizure activity last week now on Keppra.  Plan:       Will order MRI, lab studies, give 1 dose Ativan for patient's claustrophobia in MRI.  Partial seizure  -     MRI Brain Epilepsy Without Contrast; Future; Expected date: 07/17/2023  -     TSH; Future; Expected date: 07/17/2023  -     Vitamin B12; Future; Expected date: 07/17/2023  -     Folate; Future; Expected date: 07/17/2023  -     RPR; Future; Expected date: 07/17/2023  -     HIV 1/2 Ag/Ab (4th Gen); Future; Expected date: 07/17/2023    Mixed hyperlipidemia  -     Discontinue: atorvastatin (LIPITOR) 40 MG tablet; Take 1 tablet (40 mg total) by mouth every evening.  Dispense: 90 tablet; Refill: 3  -     atorvastatin (LIPITOR) 40 MG tablet; Take 1 tablet (40 mg total) by mouth every evening.  Dispense: 90 tablet; Refill: 3    Partial seizures  -     LORazepam (ATIVAN) 1 MG tablet; Take 1 tablet (1 mg total) by mouth once. for 1 dose  Dispense: 1 tablet; Refill: 0        Follow up in: 1 month        Zechariah Craig MD, MPH  [facility], PGY-3    This note was partially created using  M*Modal Voice Recognition software. Typographical and content errors may occur with this process. While efforts are made to detect and correct such errors, in some cases errors will persist. For this reason, wording in this document should be considered in the proper context and not strictly verbatim.

## 2023-07-17 NOTE — TELEPHONE ENCOUNTER
----- Message from Julia Berry sent at 7/17/2023 12:21 PM CDT -----  Type:  Sooner Appointment Request    Caller is requesting a sooner appointment.  Caller declined first available appointment listed below.  Caller will not accept being placed on the waitlist and is requesting a message be sent to doctor.    Name of Caller: DIMITRI WILKES [[id number]]  When is the first available appointment? 9/5/23 (booked and waitlisted)  Reason for Visit: Partial seizures [R56.9]  Would the patient rather a call back or a response via [facility]? Phone call   Best Call Back Number: [phone]  Additional Information: Has internal referral, requests an appointment next week if possible.

## 2023-07-18 LAB — RPR SER QL: NORMAL

## 2023-07-19 NOTE — PROGRESS NOTES
I assume primary medical responsibility for this patient. I have reviewed the history, physical, and assessement & treatment plan with the resident and agree that the care is reasonable and necessary. This service has been performed by a resident without the presence of a teaching physician under the primary care exception. If necessary, an addendum of additional findings or evaluation beyond the resident documentation will be noted below.    -Aware reviewed. No irregularities noted.       Danielle Jones MD    [facility]

## 2023-07-20 ENCOUNTER — TELEPHONE (OUTPATIENT)
Dept: FAMILY MEDICINE | Facility: HOSPITAL | Age: 63
End: 2023-07-20
Payer: COMMERCIAL

## 2023-08-07 ENCOUNTER — HOSPITAL ENCOUNTER (OUTPATIENT)
Dept: RADIOLOGY | Facility: HOSPITAL | Age: 63
Discharge: HOME | End: 2023-08-07
Attending: STUDENT IN AN ORGANIZED HEALTH CARE EDUCATION/TRAINING PROGRAM
Payer: COMMERCIAL

## 2023-08-07 DIAGNOSIS — R56.9 PARTIAL SEIZURE: ICD-10-CM

## 2023-08-07 PROCEDURE — 70551 MRI BRAIN STEM W/O DYE: CPT | Mod: TC

## 2023-08-07 PROCEDURE — 70551 MRI BRAIN EPILEPSY WITHOUT CONTRAST: ICD-10-PCS | Mod: 26,,, | Performed by: RADIOLOGY

## 2023-08-07 PROCEDURE — 70551 MRI BRAIN STEM W/O DYE: CPT | Mod: 26,,, | Performed by: RADIOLOGY

## 2023-08-10 DIAGNOSIS — R56.9 PARTIAL SEIZURES: ICD-10-CM

## 2023-08-10 NOTE — TELEPHONE ENCOUNTER
----- Message from Khloe Garcia sent at 8/10/2023  1:05 PM CDT -----  Regarding: refillm on med  Pt called for a refill on his medicine....levETIRAcetam (KEPPRA) 500 MG Tab.. send to [facility].. call back # [phone]

## 2023-08-11 RX ORDER — LEVETIRACETAM 500 MG/1
500 TABLET ORAL 2 TIMES DAILY
Qty: 60 TABLET | Refills: 0 | Status: SHIPPED | OUTPATIENT
Start: 2023-08-11 | End: 2023-09-05

## 2023-09-05 ENCOUNTER — OFFICE VISIT (OUTPATIENT)
Dept: NEUROLOGY | Facility: CLINIC | Age: 63
End: 2023-09-05
Payer: COMMERCIAL

## 2023-09-05 VITALS
SYSTOLIC BLOOD PRESSURE: 138 MMHG | DIASTOLIC BLOOD PRESSURE: 74 MMHG | BODY MASS INDEX: 25.65 KG/M2 | WEIGHT: 183.19 LBS | HEART RATE: 82 BPM | HEIGHT: 71 IN

## 2023-09-05 DIAGNOSIS — G40.109 FOCAL EPILEPSY WITHOUT IMPAIRMENT OF CONSCIOUSNESS: Primary | ICD-10-CM

## 2023-09-05 PROCEDURE — 3075F PR MOST RECENT SYSTOLIC BLOOD PRESS GE 130-139MM HG: ICD-10-PCS | Mod: CPTII,S$GLB,, | Performed by: PSYCHIATRY & NEUROLOGY

## 2023-09-05 PROCEDURE — 1159F PR MEDICATION LIST DOCUMENTED IN MEDICAL RECORD: ICD-10-PCS | Mod: CPTII,S$GLB,, | Performed by: PSYCHIATRY & NEUROLOGY

## 2023-09-05 PROCEDURE — 99999 PR PBB SHADOW E&M-EST. PATIENT-LVL III: CPT | Mod: PBBFAC,,, | Performed by: PSYCHIATRY & NEUROLOGY

## 2023-09-05 PROCEDURE — 3078F PR MOST RECENT DIASTOLIC BLOOD PRESSURE < 80 MM HG: ICD-10-PCS | Mod: CPTII,S$GLB,, | Performed by: PSYCHIATRY & NEUROLOGY

## 2023-09-05 PROCEDURE — 3008F PR BODY MASS INDEX (BMI) DOCUMENTED: ICD-10-PCS | Mod: CPTII,S$GLB,, | Performed by: PSYCHIATRY & NEUROLOGY

## 2023-09-05 PROCEDURE — 99204 PR OFFICE/OUTPT VISIT, NEW, LEVL IV, 45-59 MIN: ICD-10-PCS | Mod: S$GLB,,, | Performed by: PSYCHIATRY & NEUROLOGY

## 2023-09-05 PROCEDURE — 99999 PR PBB SHADOW E&M-EST. PATIENT-LVL III: ICD-10-PCS | Mod: PBBFAC,,, | Performed by: PSYCHIATRY & NEUROLOGY

## 2023-09-05 PROCEDURE — 99204 OFFICE O/P NEW MOD 45 MIN: CPT | Mod: S$GLB,,, | Performed by: PSYCHIATRY & NEUROLOGY

## 2023-09-05 PROCEDURE — 3078F DIAST BP <80 MM HG: CPT | Mod: CPTII,S$GLB,, | Performed by: PSYCHIATRY & NEUROLOGY

## 2023-09-05 PROCEDURE — 3008F BODY MASS INDEX DOCD: CPT | Mod: CPTII,S$GLB,, | Performed by: PSYCHIATRY & NEUROLOGY

## 2023-09-05 PROCEDURE — 3075F SYST BP GE 130 - 139MM HG: CPT | Mod: CPTII,S$GLB,, | Performed by: PSYCHIATRY & NEUROLOGY

## 2023-09-05 PROCEDURE — 1159F MED LIST DOCD IN RCRD: CPT | Mod: CPTII,S$GLB,, | Performed by: PSYCHIATRY & NEUROLOGY

## 2023-09-05 PROCEDURE — 1160F RVW MEDS BY RX/DR IN RCRD: CPT | Mod: CPTII,S$GLB,, | Performed by: PSYCHIATRY & NEUROLOGY

## 2023-09-05 PROCEDURE — 1160F PR REVIEW ALL MEDS BY PRESCRIBER/CLIN PHARMACIST DOCUMENTED: ICD-10-PCS | Mod: CPTII,S$GLB,, | Performed by: PSYCHIATRY & NEUROLOGY

## 2023-09-05 RX ORDER — LEVETIRACETAM 750 MG/1
750 TABLET ORAL 2 TIMES DAILY
Qty: 60 TABLET | Refills: 11 | Status: SHIPPED | OUTPATIENT
Start: 2023-09-05 | End: 2024-03-15 | Stop reason: SDUPTHER

## 2023-09-05 NOTE — PROGRESS NOTES
[facility] NEUROLOGY  [facility], [place]    Date: 9/5/23  Patient Name: Aureliano Wilson   MRN: [id number]   PCP: Shakeel Walton III  Referring Provider: Shantelle Lee PA*    Chief Complaint: seizure like activity  Subjective:   Patient seen in consultation at the request of Shantelle Lee PA* for the evaluation of the above chief complaint. A copy of this note will be sent to the referring physician.      HPI:   Mr. Aureliano Wilson is a 63 y.o.RH male without significant past neurological history presenting today to discuss recent health episodes concerning for seizures.    Patient reports sound July 10, 2023, he experienced his 1st episode of sudden onset tonic posturing of the left upper extremity with flexion at the elbow and wrist toward the chest and tonic extension of the left lower extremity causing him to be unable to move the limbs for an estimated 1-2 minutes.  Over this time frame, muscles were engaged to the point of pain but no rhythmic movements or jerking.  When the event ended, limbs gradually but quickly relaxed and he was able to once again.  Very sore muscles afterwards.  Also felt very tired.  No loss of consciousness or other symptoms involved.  No bowel/bladder incontinence or tongue biting.  After seeking emergency evaluation in the urgent care setting, Neurology was reportedly consulted by phone by the facility.  Advised that they begin Keppra and initiate neurology referral for further case definition.    Patient has been taking Keppra 500 mg twice daily with no noted side effects outside of some mild fatigue.  He continued to have symptoms over the following days and highlights July 20th as having more than 3 independent episodes over the course of the day.  He is had no further episodes of this type since August 18, 2023.      Presents today for further evaluation.  MRI epilepsy protocol with/without contrast has been completed with no  significant/contributory findings.  Just small-vessel ischemia.  No evidence of large strokes in the past.    No personal history is seizures prior to this event.  No history of head trauma or neurological infection.  No history of recent illness before onset of symptoms.  No similar episodes in the past.    No EEG completed at this time.  Patient is not driving as a precaution..    PAST MEDICAL HISTORY:  Past Medical History:   Diagnosis Date    Erectile dysfunction      Patient Active Problem List   Diagnosis    Erectile dysfunction    Screening for colon cancer    Polyp of colon    Epididymitis    Abnormal stress test    Frequent PVCs    Right inguinal hernia    Status post inguinal hernia repair     PAST SURGICAL HISTORY:  Past Surgical History:   Procedure Laterality Date    COLONOSCOPY N/A 6/5/2017    Procedure: COLONOSCOPY;  Surgeon: Tung Cochran MD;  Location: [facility];  Service: Endoscopy;  Laterality: N/A;       CURRENT MEDS:  Current Outpatient Medications   Medication Sig Dispense Refill    atorvastatin (LIPITOR) 40 MG tablet Take 1 tablet (40 mg total) by mouth every evening. (Patient not taking: Reported on 9/5/2023) 90 tablet 3    levETIRAcetam (KEPPRA) 750 MG Tab Take 1 tablet (750 mg total) by mouth 2 (two) times daily. 60 tablet 11    LORazepam (ATIVAN) 1 MG tablet Take 1 tablet (1 mg total) by mouth once. for 1 dose (Patient not taking: Reported on 9/5/2023) 1 tablet 0     No current facility-administered medications for this visit.       ALLERGIES:  Review of patient's allergies indicates:  No Known Allergies    FAMILY HISTORY:  Family History   Problem Relation Age of Onset    Diabetes Father     Stroke Father     Diabetes Mother        SOCIAL HISTORY:  Social History     Tobacco Use    Smoking status: Every Day     Current packs/day: 0.25     Average packs/day: 0.3 packs/day for 30.0 years (7.5 ttl pk-yrs)     Types: Cigarettes    Smokeless tobacco: Current    Tobacco comments:     6  "cigarettes a day   Substance Use Topics    Alcohol use: Yes     Alcohol/week: 2.0 standard drinks of alcohol     Types: 2 Glasses of wine per week     Comment: socially    Drug use: No     Review of Systems:  Gen: no fever, no chills, no generalized feeling of weakness   HEENT: no double vision, no blurred vision, no eye pain, no eye exudates. no nasal congestion,   no traumatic injury of head, no neck pain, no neck stiffness. no photophobia or phonophobia at this time. ?    Heart: no chest pain, no SOB    Lungs: no SOB, no cough    MSK: no weakness of legs, intact ROM    ABD: no abd pain, no N/V/D/C, no difficulty with defecation.    Extremities: No leg pain, no edema.     Objective:     Vitals:    09/05/23 1448   BP: 138/74   Pulse: 82   Weight: 83.1 kg (183 lb 3.2 oz)   Height: 5' 11" (1.803 m)     General: male in NAD, alert and awake, Aox3, well groomed. ?    ? ?    HEENT: Head is NC/AT EOMI, pupil size: 4 mm B/L, no nystagmus noted; hearing grossly intact b/l. Mucous membrane moist, uvula midline, no pharyngeal erythema, exudates or discharges.      Neck: Supple. no nuchal rigidity.      Cardiovascular: well perfused, no cyanosis        Respiratory: Symmetric chest rise noted       Musculoskeletal: Muscle tone noted to be adequate for patient age, muscle mass is WNL. No spontaneous movements or fasciculations noted during this examination.       Extremities: No pedal edema or calf tenderness. No cogwheel rigidity noted on B/L UE extremities.       Neurological Examination.    Mental status: AA&O x3; Affect/mood is euthymic/congruent; no aphasia noted during examination. Patient answers simple questions appropriately & follows simple commands; no dysarthria or expressive aphasia; no anatoliy-neglect or extinction. Vocabulary/word finding: excellent.       Cranial Nerves: II-XII grossly intact. visual fields intact to confrontation, EOMI, no nystagmus, PERRLA,?facial muscles symmetric and no facial droop noted, " "patient hearing is grossly intact b/l, ?facial sensation to sharp and light touch grossly intact B/L. Patient smiles, frowns, closes eyes ?forcefully uneventfully. Uvula midline and no difficulty with pronunciation. No SCM/trapezius/muscle of mastication weakness noted B/L. Patient has adequate control of tongue and may protrude it and move it adequately.       Muscle Function: Tone WNL and Muscle bulk WNL.  5/5 throughout     Sensory: ?Intact to light touch throughout     Reflexes: Left and Right biceps, triceps, brachioradialis are 2+/4. patellar 2+/4 on Left and 2+/4 on Right, B/L Achilles 2+/4     Coordination: no dysmetria (finger to nose negative)     Gait: adequate casual gait with stride length and arm swing WNL.  Stable without assistance    OTHER:  08/07/23 MRI Brain w/wo, epilepsy:  "FINDINGS:  No abnormal focus of diffusion restriction.  No parenchymal susceptibility to suggest sequela of remote microhemorrhage.  No extra-axial collection or midline shift.  Stable ventricular size.  Mild periventricular and supratentorial T2 FLAIR white matter hyperintensity, nonspecific though can be seen with sequela of chronic microangiopathic change.  No infiltrative T1 marrow calvarial process.  Scattered mucosal thickening at the ethmoid and maxillary sinuses.  Impression:  No acute intracranial abnormality.  Mild periventricular and supratentorial T2 FLAIR white matter hyperintensity, nonspecific though can be seen with sequela of chronic microangiopathic change."    Assessment:   Aureliano Wilson is a 63 y.o. male presenting for evaluation of unusual episodes of sudden-onset tonic posturing of the entire left upper and left lower extremity lasting 1-2 minutes leaving the patient feeling exhausted for several hours afterwards.  He is experienced multiple events of this nature.  He is not experienced loss of consciousness or other uncontrolled movements during these events.  MRI brain has been completed and found to " be noncontributory.  We will initiate EEG for further case definition.  In the interim, we will increase Keppra to a more appropriate 750 mg twice daily.  Patient was counseled extensively on seizure safety and instructed to reach out to our clinic immediately if breakthrough symptoms occur.    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 We also discussed avoiding driving or operating other heavy machinery for 6 months after a breakthrough event in the [facility].   - Advised the patient to avoid seizure provoking behaviors including excessive alcohol consumption, sleep deprivation, and medication noncompliance.   - EEG  - follow-up in 6 months or sooner as needed; specifically instructed to reach out to our clinic immediately if breakthrough symptoms occur.  Also specifically instructed to seek emergency evaluation if multiple events occur before able to returned to baseline.    I spent a total of 45 minutes on the day of the visit. This includes face to face time and non-face to face time preparing to see the patient (eg, review of tests), obtaining and/or reviewing separately obtained history, documenting clinical information in the electronic or other health record, independently interpreting results and communicating results to the patient/family/caregiver, or care coordinator.    A dictation device was used to produce this document. Use of such devices sometimes results in grammatical errors or replacement of words that sound similarly.    Stan Vela,  DO

## 2023-09-18 ENCOUNTER — HOSPITAL ENCOUNTER (OUTPATIENT)
Dept: NEUROLOGY | Facility: CLINIC | Age: 63
Discharge: HOME | End: 2023-09-18
Payer: COMMERCIAL

## 2023-09-18 DIAGNOSIS — G40.109 FOCAL EPILEPSY WITHOUT IMPAIRMENT OF CONSCIOUSNESS: ICD-10-CM

## 2023-09-18 PROCEDURE — 95816 PR EEG,W/AWAKE & DROWSY RECORD: ICD-10-PCS | Mod: S$GLB,,, | Performed by: PSYCHIATRY & NEUROLOGY

## 2023-09-18 PROCEDURE — 95816 EEG AWAKE AND DROWSY: CPT | Mod: S$GLB,,, | Performed by: PSYCHIATRY & NEUROLOGY

## 2023-09-20 NOTE — PROCEDURES
Routine EEG Report    Aureliano Wilson  [id number]  1960    DATE OF SERVICE:  09/18/2023  REASON FOR CONSULT:  63-year-old man with episodes of dystonic movements with no alteration in awareness.  Evaluate for evidence of epileptiform activity.    METHODOLOGY   Electroencephalographic (EEG) recording is with electrodes placed according to the International 10-20 placement system.  Thirty two (32) channels of digital signal (sampling rate of 512/sec) including T1 and T2 was simultaneously recorded from the scalp and may include  EKG, EMG, and/or eye monitors.  Recording band pass was 0.1 to 512 hz.  Digital video recording of the patient is simultaneously recorded with the EEG.  The patient is instructed report clinical symptoms which may occur during the recording session.  EEG and video recording is stored and archived in digital format. Activation procedures which include photic stimulation, hyperventilation and instructing patients to perform simple task are done in selected patients.    The EEG is displayed on a monitor screen and can be reviewed using different montages.  Computer assisted analysis is employed to detect spike and electrographic seizure activity.   The entire record is submitted for computer analysis.  The entire recording is visually reviewed and the times identified by computer analysis as being spikes or seizures are reviewed again.  Compresses spectral analysis (CSA) is also performed on the activity recorded from each individual channel.  This is displayed as a power display of frequencies from 0 to 30 Hz over time.   The CSA is reviewed looking for asymmetries in power between homologous areas of the scalp and then compared with the original EEG recording.     Th[employer] software is also utilized in the review of this study.  This software suite analyzes the EEG recording in multiple domains.  Coherence and rhythmicity is computed to identify EEG sections which may contain organized  seizures.  Each channel undergoes analysis to detect presence of spike and sharp waves which have special and morphological characteristic of epileptic activity.  The routine EEG recording is converted from spacial into frequency domain.  This is then displayed comparing homologous areas to identify areas of significant asymmetry.  Algorithm to identify non-cortically generated artifact is used to separate eye movement, EMG and other artifact from the EEG.      EEG FINDINGS  Background activity:   The waking background is continuous and symmetric with a well-formed 9 hz posterior dominant rhythm seen bilaterally.    Sleep:  Sleep is not captured during this recording session.    Activation procedures:   The patient is able to follow simple commands and answers orientation questions correctly. Photic stimulation is performed with no activation of the record.  Hyperventilation is performed with good effort with no activation of the record.     Cardiac Monitor:   Irregular    Impression:   This is a normal awake routine EEG.  There are no prominent focal findings, no epileptiform discharges, and no electrographic seizures.   Of note, a normal EEG does not rule out the diagnosis of epilepsy.  Clinical correlation is advised.    Denisse Ahuja MD PhD [facility]  Neurology-Epilepsy  [facility].

## 2024-01-23 ENCOUNTER — TELEPHONE (OUTPATIENT)
Dept: NEUROLOGY | Facility: CLINIC | Age: 64
End: 2024-01-23
Payer: COMMERCIAL

## 2024-01-23 NOTE — TELEPHONE ENCOUNTER
----- Message from Nusrat Morley sent at 1/23/2024 11:41 AM CST -----  Regarding: Call back  Contact: [phone]  Type:  Sooner Apoointment Request    Caller is requesting a sooner appointment.  Caller declined first available appointment listed below.  Caller will not accept being placed on the waitlist and is requesting a message be sent to doctor.  Name of Caller: New PT   When is the first available appointment? Was asked to send a message   Symptoms: Seizures   Would the patient rather a call back or a response via [facility]? Call back   Best Call Back Number: [phone]   Additional Information:

## 2024-01-23 NOTE — TELEPHONE ENCOUNTER
Called and spoke to Araceli about scheduling the patient to see a neurologist since Dr. Vela left. I told her that the first available is July with Dr. Watts she said that was too far to wait so I messaged and gave her the [facility] phone number to assist with getting the patient scheduled. Araceli voiced understanding.

## 2024-02-27 ENCOUNTER — TELEPHONE (OUTPATIENT)
Dept: FAMILY MEDICINE | Facility: HOSPITAL | Age: 64
End: 2024-02-27
Payer: COMMERCIAL

## 2024-02-27 NOTE — TELEPHONE ENCOUNTER
----- Message from Saulo Olivier sent at 2/21/2024  3:58 PM CST -----  Regarding: appt  Type:  appt    Who Called:wife   Does the patient know what this is regarding?:appt   Would the patient rather a call back or a response via [facility]? Call   Best Call Back Number: [phone]  Additional Information: caller stated she's been waiting on a call for over a month

## 2024-02-28 ENCOUNTER — TELEPHONE (OUTPATIENT)
Dept: FAMILY MEDICINE | Facility: HOSPITAL | Age: 64
End: 2024-02-28
Payer: COMMERCIAL

## 2024-02-28 NOTE — TELEPHONE ENCOUNTER
----- Message from Kate Meza sent at 2/28/2024 10:51 AM CST -----  Regarding: return call  Type:  Patient Returning Call    Who Called:pt  Who Left Message for Patient:Clarita  Does the patient know what this is regarding?:appointment   Would the patient rather a call back or a response via [facility]? Call  Best Call Back Number:[phone]  Additional Information:

## 2024-03-01 ENCOUNTER — OFFICE VISIT (OUTPATIENT)
Dept: FAMILY MEDICINE | Facility: HOSPITAL | Age: 64
End: 2024-03-01
Attending: FAMILY MEDICINE
Payer: COMMERCIAL

## 2024-03-01 VITALS
BODY MASS INDEX: 28.7 KG/M2 | DIASTOLIC BLOOD PRESSURE: 83 MMHG | SYSTOLIC BLOOD PRESSURE: 152 MMHG | HEIGHT: 71 IN | WEIGHT: 205 LBS | HEART RATE: 63 BPM

## 2024-03-01 DIAGNOSIS — R35.1 NOCTURIA: ICD-10-CM

## 2024-03-01 DIAGNOSIS — R63.5 WEIGHT GAIN: ICD-10-CM

## 2024-03-01 DIAGNOSIS — Z51.81 ENCOUNTER FOR MEDICATION MONITORING: ICD-10-CM

## 2024-03-01 DIAGNOSIS — G40.109 FOCAL EPILEPSY WITHOUT IMPAIRMENT OF CONSCIOUSNESS: Primary | ICD-10-CM

## 2024-03-01 DIAGNOSIS — Z79.899 ON ANTIEPILEPTIC THERAPY: ICD-10-CM

## 2024-03-01 DIAGNOSIS — R63.1 POLYDIPSIA: ICD-10-CM

## 2024-03-01 DIAGNOSIS — G47.00 INSOMNIA, UNSPECIFIED TYPE: ICD-10-CM

## 2024-03-01 DIAGNOSIS — M62.81 MUSCLE WEAKNESS (GENERALIZED): ICD-10-CM

## 2024-03-01 DIAGNOSIS — R53.83 FATIGUE, UNSPECIFIED TYPE: ICD-10-CM

## 2024-03-01 DIAGNOSIS — R26.81 GAIT INSTABILITY: ICD-10-CM

## 2024-03-01 NOTE — PROGRESS NOTES
Subjective:       Patient ID: Aureliano Wilson is a 64 y.o. male.    Chief Complaint: Seizures, Fatigue, and Weight Gain    Seizures   Pertinent negatives include no visual disturbance, no chest pain, no nausea and no vomiting.   Fatigue  Associated symptoms include fatigue. Pertinent negatives include no abdominal pain, chest pain, nausea or vomiting.       Patient reports sound July 10, 2023, he experienced his 1st episode of sudden onset tonic posturing of the left upper extremity with flexion at the elbow and wrist toward the chest and tonic extension of the left lower extremity causing him to be unable to move the limbs for an estimated 1-2 minutes.  Over this time frame, muscles were engaged to the point of pain but no rhythmic movements or jerking.  When the event ended, limbs gradually but quickly relaxed and he was able to once again.  Very sore muscles afterwards.  Also felt very tired.  No loss of consciousness or other symptoms involved.  No bowel/bladder incontinence or tongue biting.    Patient has been taking Keppra 500 mg twice daily with no noted side effects outside of some mild fatigue.  He continued to have symptoms over the following days and highlights July 20th as having more than 3 independent episodes over the course of the day.  He has had no further episodes of this type since August 18, 2023, but had episode in Dec where loss control of left leg.     Presents today for further evaluation.   MRI epilepsy protocol with/without contrast has been completed with no significant/contributory findings.  Just small-vessel ischemia.  No evidence of large strokes in the past.     No personal history is seizures prior to this event.  No history of head trauma or neurological infection.  No history of recent illness before onset of symptoms.  No similar episodes in the past.    EEG completed. Resulted as  a normal awake routine EEG.  There are no prominent focal findings, no epileptiform  "discharges, and no electrographic seizures.  Of note, a normal EEG does not rule out the diagnosis of epilepsy.    Patient is not driving as a precaution..    Today patient voices the the following complaints:    Bilateral ankle swelling that resolves with elevation.  Progressive weakness(Fatigue?) over the past 6 months. Does endorse decrease activity level since dx of partial seizure.   Occasional tingling and numbness of feet.  Becomes extremely sleepy after every meal. To the point of having to go to sleep. Upon questioning he states experiencing polyuria, nocturia, polydipsia.  Weight gain of 20 lb since dx. Wife states poor diet which she has changed over the past few weeks.  Occasional gait instability- states he has to "think about how to walk" in order not to stumble.     INSOMNIA ASSESSMENT:   Reported degree of insomnia: severe  Insomnia episode began: more than 1 month ago  Insomnia episode progress: unchanged  Sleep patterns during the week:     Bedroom environment: comfortable and dark      Time to fall asleep: 20 minutes    Number of nighttime awakenings: multiple, early a.m. for rest of night and early a.m.    Difficulty going back to sleep: Yes      Activity when awakened: uses the bathroom and plays on mobile device      Premature morning awakening: Yes      Difficult to arouse in the morning: No      Working off-shift hours: No      Frequency of use: never  Napping:     Naps during the day: No    Evidence of other sleep disorders:     Obstructive sleep apnea: No      Restless leg syndrome: No      Disturbing dreams: No      Night terrors: No      Somnambulism: No    Substances that might interfere with sleep:     Caffeine: Yes      Nicotine: No      Alcohol: No      Other substances: No    Other factors that might interfere with sleep:     Irregular sleep schedule: No             Review of Systems   Constitutional:  Positive for fatigue.   HENT:  Negative for hearing loss.    Eyes:  Negative for " "visual disturbance.   Cardiovascular:  Positive for leg swelling. Negative for chest pain and palpitations.   Gastrointestinal:  Negative for abdominal distention, abdominal pain, nausea and vomiting.   Endocrine: Negative for cold intolerance, heat intolerance, polydipsia and polyuria.   Neurological:  Positive for seizures.         Past Medical History:   Diagnosis Date    Erectile dysfunction        Family History   Problem Relation Age of Onset    Diabetes Father     Stroke Father     Diabetes Mother        Social History     Socioeconomic History    Marital status:    Tobacco Use    Smoking status: Every Day     Current packs/day: 0.25     Average packs/day: 0.3 packs/day for 30.0 years (7.5 ttl pk-yrs)     Types: Cigarettes    Smokeless tobacco: Current    Tobacco comments:     6 cigarettes a day   Substance and Sexual Activity    Alcohol use: Yes     Alcohol/week: 2.0 standard drinks of alcohol     Types: 2 Glasses of wine per week     Comment: socially    Drug use: No    Sexual activity: Yes     Partners: Female       Past Surgical History:   Procedure Laterality Date    COLONOSCOPY N/A 6/5/2017    Procedure: COLONOSCOPY;  Surgeon: Tung Cochran MD;  Location: [facility];  Service: Endoscopy;  Laterality: N/A;         Current Outpatient Medications:     levETIRAcetam (KEPPRA) 750 MG Tab, Take 1 tablet (750 mg total) by mouth 2 (two) times daily., Disp: 60 tablet, Rfl: 11    Checklist of Daily Activities:   independent for UE/LE dressing, toileting, brush teeth, and washface with no assistive devices.  Able to preform shopping for groceries,  using the telephone, meal preparation, housework, home repair, laundry, taking medications, handling finances.      Objective:      Body mass index is 28.6 kg/m².  Vitals:    03/01/24 0926   BP: (!) 152/83   Pulse: 63   Weight: 93 kg (205 lb 0.4 oz)   Height: 5' 11" (1.803 m)     Physical Exam  Constitutional:       General: He is not in acute " distress.     Appearance: Normal appearance. He is not ill-appearing.   HENT:      Head: Normocephalic and atraumatic.      Right Ear: Tympanic membrane, ear canal and external ear normal. There is no impacted cerumen.      Left Ear: Tympanic membrane, ear canal and external ear normal. There is no impacted cerumen.      Nose: Nose normal.   Eyes:      General: No scleral icterus.     Extraocular Movements: Extraocular movements intact.      Pupils: Pupils are equal, round, and reactive to light.   Cardiovascular:      Rate and Rhythm: Normal rate and regular rhythm.      Heart sounds: No murmur heard.     No friction rub. No gallop.   Pulmonary:      Effort: Pulmonary effort is normal. No respiratory distress.      Breath sounds: Normal breath sounds. No stridor. No wheezing, rhonchi or rales.   Chest:      Chest wall: No tenderness.   Musculoskeletal:      Cervical back: Normal range of motion and neck supple.   Skin:     General: Skin is warm and dry.      Capillary Refill: Capillary refill takes less than 2 seconds.   Neurological:      General: No focal deficit present.      Mental Status: He is alert and oriented to person, place, and time.      Cranial Nerves: No cranial nerve deficit.      Sensory: No sensory deficit.      Motor: No weakness.      Coordination: Coordination normal.      Gait: Gait normal.      Deep Tendon Reflexes: Reflexes normal.   Psychiatric:         Mood and Affect: Mood normal.         Behavior: Behavior normal.         Thought Content: Thought content normal.         Judgment: Judgment normal.             3/1/2024    11:00 AM   GAD7   1. Feeling nervous, anxious, or on edge? 0   2. Not being able to stop or control worrying? 0   3. Worrying too much about different things? 0   4. Trouble relaxing? 0   5. Being so restless that it is hard to sit still? 0   6. Becoming easily annoyed or irritable? 0   7. Feeling afraid as if something awful might happen? 0   8. If you checked off any  problems, how difficult have these problems made it for you to do your work, take care of things at home, or get along with other people? 0   JJ-7 Score 0         3/1/2024    10:59 AM   PHQ9   Little interest or pleasure in doing things 3   Feeling down, depressed, or hopeless 0   Trouble falling or staying asleep, or sleeping too much 3   Feeling tired or having little energy 3   Poor appetite or overeating 3   Feeling bad about yourself - or that you are a failure or have let yourself or your family down 0   Trouble concentrating on things, such as reading the newspaper or watching television 0   Moving or speaking so slowly that other people could have noticed. Or the opposite - being so fidgety or restless that you have been moving around a lot more than usual 0   Thoughts that you would be better off dead, or of hurting yourself in some way 0   If you checked off any problems, how difficult have these problems made it for you to do your work, take care of things at home, or get along with other people? 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 Gait instability    3. Fatigue, unspecified type    4. Insomnia, unspecified type    5. Weight gain    6. Encounter for medication monitoring    7. On antiepileptic therapy    8. Polydipsia    9. Nocturia    10. 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 Continue current meditation(s).    -     PT evaluate and treat; Future    Gait instability  Worsening chronic condition.    -     PT evaluate and treat; Future  -     Ambulatory referral/consult to Physical/Occupational Therapy; Future; Expected date: 03/08/2024    Fatigue, unspecified type  Stable chronic condition.    -     PT evaluate and treat; Future  -     TSH; Future; Expected date: 03/01/2024  -     CBC Auto Differential; Future; Expected date:  03/01/2024    Insomnia, unspecified type  Stable chronic condition. Continue current meditation(s).   Insomnia handout given in AVS.    Weight gain  Encouraged healthy eating and getting active.    -     TSH; Future; Expected date: 03/01/2024  -     Hemoglobin A1C; Future; Expected date: 03/01/2024    Encounter for medication monitoring  -     Comprehensive Metabolic Panel; Future; Expected date: 03/01/2024  -     Levetiracetam level; Future; Expected date: 03/01/2024    On antiepileptic therapy  -     TSH; Future; Expected date: 03/01/2024  -     CBC Auto Differential; Future; Expected date: 03/01/2024  -     Comprehensive Metabolic Panel; Future; Expected date: 03/01/2024  -     Levetiracetam level; Future; Expected date: 03/01/2024  -     Methylmalonic Acid, Serum; Future; Expected date: 03/01/2024  -     Folate; Future; Expected date: 03/01/2024    Polydipsia/Nocturia  R/o diabetes  -     Hemoglobin A1C; Future; Expected date: 03/01/2024`        Muscle weakness (generalized)  Mostly deconditioning. Encouraged increasing activity. PT referral do to instability   -     CK; Future; Expected date: 03/01/2024  -     Ambulatory referral/consult to Physical/Occupational Therapy; Future; Expected date: 03/08/2024      No follow-ups on file.        Goal BP<140/90  Goal A1C <7.0  Goal BMI <30    General weight loss/lifestyle modification strategies discussed (elicit support from others; identify saboteurs; non-food rewards, etc).  Behavioral treatment: Overeaters Anonymous and stress management.  Diet interventions: diet diary indefinitely and moderate (500 kCal/d) deficit diet.  Informal exercise measures discussed, e.g. taking stairs instead of elevator.  Regular aerobic exercise program discussed.    A total of 40 minutes were spent face-to-face with the patient during this encounter and over half of that time was spent on counseling and coordination of care. We discussed in depth the importance of adherence diabetic  low salt diet and exercise. I also educated the patient about lifestyle modifications which may improve blood pressure.

## 2024-03-07 ENCOUNTER — CLINICAL SUPPORT (OUTPATIENT)
Dept: REHABILITATION | Facility: HOSPITAL | Age: 64
End: 2024-03-07
Attending: FAMILY MEDICINE
Payer: COMMERCIAL

## 2024-03-07 DIAGNOSIS — M62.81 MUSCLE WEAKNESS (GENERALIZED): ICD-10-CM

## 2024-03-07 DIAGNOSIS — Z74.09 IMPAIRED FUNCTIONAL MOBILITY, BALANCE, GAIT, AND ENDURANCE: Primary | ICD-10-CM

## 2024-03-07 DIAGNOSIS — R26.81 GAIT INSTABILITY: ICD-10-CM

## 2024-03-07 PROCEDURE — 97162 PT EVAL MOD COMPLEX 30 MIN: CPT | Mod: PN

## 2024-03-07 NOTE — PLAN OF CARE
[facility]  Physical Therapy Neurological Rehabilitation Initial Evaluation     Name: Aureliano Wilson  Clinic Number: [id number]    Therapy Diagnosis:   Encounter Diagnoses   Name Primary?    Gait instability     Muscle weakness (generalized)     Impaired functional mobility, balance, gait, and endurance Yes     Physician: Shakeel Walton III, MD    Physician Orders: PT Eval and Treat   Medical Diagnosis from Referral: Gait instability [R26.81], Muscle weakness (generalized) [M62.81]   Evaluation Date: 3/7/2024  Authorization Period Expiration: 12/31/2024  Plan of Care Expiration: 5/3/2024  Progress Note Due: 4/7/2024  Date of Surgery: N/A  Visit # / Visits authorized: 1/1  FOTO: 1/3    Precautions: Standard and Seizure    Time In: 4:15 (patient with late arrival)  Time Out: 4:45PM  Total Billable Time: 30 minutes (1 mod eval)    Subjective      Date of onset: Summer 2023    History of current condition - Aureliano reports: Has been having some issues with balance/weakness since he began taking seizure medication in summer of last year. Has been limiting his activity so he has lost endurance and gained weight since. He has always been very fit but this is the first time in his life where he hasn't been able to maintain preferred activity level.      Imaging  - CT Head (7/14/2023): No acute intracranial findings. No definite etiology of the patient's symptoms. MRI would be recommended for increased sensitivity and specificity for further evaluation of the patient's symptoms, as warranted clinically.   - MRI Brain Epilepsy (8/7/2023): No acute intracranial abnormality. Mild periventricular and supratentorial T2 FLAIR white matter hyperintensity, nonspecific though can be seen with sequela of chronic microangiopathic change.    Prior Therapy: in the past for orthopedic complaints  Social History: lives with wife   Falls: denies   Home Environment:  two-story [place]  Exercise Routine / History:  "not currently   Family Present at time of Eval: no   Occupation: works from home;   Prior Level of Function: independent  Current Level of Function: wife taking on additional household roles; not currently driving due to seizure precuations    Pain:  Current 0/10, worst 4/10, best 0/10   Location: left leg   Description: Aching, swelling  Aggravating Factors: sitting for long periods of time  Easing Factors: elevation    Patient's goals: "I would really like to be able to run or walk every day" - note, when patient asked, he has not gone on run in close to 20 years    Medical History:   Past Medical History:   Diagnosis Date    Erectile dysfunction      Surgical History:   Aureliano Wilson  has a past surgical history that includes Colonoscopy (N/A, 2017).    Medications:   Aureliano has a current medication list which includes the following prescription(s): levetiracetam.    Allergies:   Review of patient's allergies indicates:  No Known Allergies     Objective      - Command followin% simple and complex   - Speech: no deficits     Mental status: alert, oriented to person, place, and time, normal mood, behavior, speech, dress, motor activity, and thought processes  Behavior:  calm, cooperative, and adequate rapport can be established  Attention Span and Concentration:  Normal    Tone: 0 - No increase in muscle tone  Limbs/muscles affected: major muscle groups     RANGE OF MOTION--LOWER EXTREMITIES  (R) LE Moderately decreased hip flexor length; mildly decreased hamstring length  (L) LE: Moderately decreased hip flexor length; mildly decreased hamstring length    Lower Extremity Strength   RLE LLE   Hip Flexion: 4/5 4/5   Hip Extension:  3-/5 3-/5   Hip Abduction: 4/5 4+/5   Knee Extension: 5/5 5/5   Knee Flexion: 4+/5 4+/5   Ankle Dorsiflexion: 4+/5 4+/5     Resting Vitals  HR: 64bpm  O2: 100%    30-Second Sit to Stand: 8 repetitions without bilateral upper extremity assist    Post Sit " to Stand Vitals  HR: 86bpm  O2: 98%    Two-Minute Walk Test: 360 feet with no AD    Six-Minute Walk Test: Unable to complete; test self-terminated in <5 minutes     Post Walk Test Vitals  HR: 81bpm  O2: 98%    Gait Assessment:   - AD used: none  - Assistance: independent   - Distance: 500+ feet throughout course of assessment  - Stairs: not assessed today    Gait Analysis:  Deviations noted: decreased step length bilaterally; decreased hip extension at terminal contact; decreased trunk rotation    Impairments contributing to deviations:  general debility; flexibility impairments      Intake Outcome Measure for FOTO Activities-specific Balance Confidence  (ABC) Scale Survey    Therapist reviewed FOTO scores for Aureliano Wilson on 3/7/2024.   FOTO report - see Media section or FOTO account episode details.    Intake Score: 20%       Treatment     Treatment not initiated today; suggestions for follow up = strengthening HEP; circuit training; stepper; treadmill; standing therapeutic exercise and open chain mat exercise against gravity; hamstring/hip flexor stretching    Patient Education and Home Exercises     Education provided:   - PT plan of care  - Begin 2-minute walking program every half hour while awake; progress weekly/as able  - Realistic goal setting     Written Home Exercises Provided:  Verbally provided basic walking program; strength-based HEP to be provided at follow up .    Assessment     Aureliano is a 64 y.o. male referred to outpatient Physical Therapy with a medical diagnosis of Gait instability [R26.81], Muscle weakness (generalized) [M62.81] . Patient presents with bilateral lower extremity muscular strength and power deficits; decreased flexibility of bilateral lower extremity; impaired cardiovascular endurance; decreased balance confidence; and impaired self-perception of functional mobility. He would benefit from skilled physical therapy services to address listed impairments and improve overall  quality of life.     Patient prognosis is Good.   Patient will benefit from skilled outpatient Physical Therapy to address the deficits stated above and in the chart below, provide patient /family education, and to maximize patient's level of independence.     Plan of care discussed with patient: Yes  Patient's spiritual, cultural and educational needs considered and patient is agreeable to the plan of care and goals as stated below:     Anticipated Barriers for therapy: work commitments/schedule access    Medical Necessity is demonstrated by the following  History  Co-morbidities and personal factors that may impact the plan of care [x] LOW: no personal factors / co-morbidities  [] MODERATE: 1-2 personal factors / co-morbidities  [] HIGH: 3+ personal factors / co-morbidities    Moderate / High Support Documentation:   Co-morbidities affecting plan of care: history of seizure    Personal Factors:   no deficits     Examination  Body Structures and Functions, activity limitations and participation restrictions that may impact the plan of care [] LOW: addressing 1-2 elements  [] MODERATE: 3+ elements  [x] HIGH: 4+ elements (please support below)    Moderate / High Support Documentation: see above     Clinical Presentation [] LOW: stable  [x] MODERATE: Evolving  [] HIGH: Unstable     Decision Making/ Complexity Score: moderate       Goals:  Short Term Goals: 4 weeks   Patient will be compliant with HEP in order to maximize PT benefits  Patient will score >/= 40% on FOTO ABC survey in order to improve self-perception of functional mobility   Patient will score >/= 10 repetitions on 30-Second Sit to  order to improve bilateral lower extremity endurance and muscular power for transfers   Patient will complete and walk >/= 800 feet on Six-Minute Walk Test in order to improve endurance for community mobility     Long Term Goals: 8 weeks   Patient will score >/= 60% on FOTO ABC survey in order to improve  self-perception of functional mobility   Patient will improve bilateral lower extremity MMT grades by >/=1/2 grade in order to improve strength for ADL completion  Patient will score >/= 12 repetitions on 30-Second Sit to  order to improve bilateral lower extremity endurance and muscular power for transfers   Patient will complete and walk >/= 1000 feet on Six-Minute Walk Test in order to improve endurance for community mobility   Patient will begin some form of home/community fitness in order to sustain progress gained in PT    Plan     Plan of care Certification: 3/7/2024 to 5/3/2024.    Outpatient Physical Therapy 2 times weekly for 8 weeks to include the following interventions: Gait Training, Manual Therapy, Moist Heat/ Ice, Neuromuscular Re-ed, Patient Education, Therapeutic Activities, and Therapeutic Exercise.     AMARA KIRKLAND, PT        Physician's Signature: _________________________________________ Date: ________________

## 2024-03-15 ENCOUNTER — OFFICE VISIT (OUTPATIENT)
Dept: NEUROLOGY | Facility: CLINIC | Age: 64
End: 2024-03-15
Payer: COMMERCIAL

## 2024-03-15 VITALS
BODY MASS INDEX: 28.64 KG/M2 | HEART RATE: 83 BPM | DIASTOLIC BLOOD PRESSURE: 93 MMHG | WEIGHT: 204.56 LBS | SYSTOLIC BLOOD PRESSURE: 141 MMHG | HEIGHT: 71 IN

## 2024-03-15 DIAGNOSIS — G40.109 FOCAL EPILEPSY WITHOUT IMPAIRMENT OF CONSCIOUSNESS: ICD-10-CM

## 2024-03-15 PROCEDURE — 1159F MED LIST DOCD IN RCRD: CPT | Mod: CPTII,S$GLB,, | Performed by: STUDENT IN AN ORGANIZED HEALTH CARE EDUCATION/TRAINING PROGRAM

## 2024-03-15 PROCEDURE — 3044F HG A1C LEVEL LT 7.0%: CPT | Mod: CPTII,S$GLB,, | Performed by: STUDENT IN AN ORGANIZED HEALTH CARE EDUCATION/TRAINING PROGRAM

## 2024-03-15 PROCEDURE — 1160F RVW MEDS BY RX/DR IN RCRD: CPT | Mod: CPTII,S$GLB,, | Performed by: STUDENT IN AN ORGANIZED HEALTH CARE EDUCATION/TRAINING PROGRAM

## 2024-03-15 PROCEDURE — 3008F BODY MASS INDEX DOCD: CPT | Mod: CPTII,S$GLB,, | Performed by: STUDENT IN AN ORGANIZED HEALTH CARE EDUCATION/TRAINING PROGRAM

## 2024-03-15 PROCEDURE — 3080F DIAST BP >= 90 MM HG: CPT | Mod: CPTII,S$GLB,, | Performed by: STUDENT IN AN ORGANIZED HEALTH CARE EDUCATION/TRAINING PROGRAM

## 2024-03-15 PROCEDURE — 3077F SYST BP >= 140 MM HG: CPT | Mod: CPTII,S$GLB,, | Performed by: STUDENT IN AN ORGANIZED HEALTH CARE EDUCATION/TRAINING PROGRAM

## 2024-03-15 PROCEDURE — 99215 OFFICE O/P EST HI 40 MIN: CPT | Mod: S$GLB,,, | Performed by: STUDENT IN AN ORGANIZED HEALTH CARE EDUCATION/TRAINING PROGRAM

## 2024-03-15 PROCEDURE — 99999 PR PBB SHADOW E&M-EST. PATIENT-LVL III: CPT | Mod: PBBFAC,,, | Performed by: STUDENT IN AN ORGANIZED HEALTH CARE EDUCATION/TRAINING PROGRAM

## 2024-03-15 RX ORDER — LEVETIRACETAM 750 MG/1
750 TABLET ORAL 2 TIMES DAILY
Qty: 180 TABLET | Refills: 3 | Status: SHIPPED | OUTPATIENT
Start: 2024-03-15 | End: 2025-03-15

## 2024-03-15 NOTE — PROGRESS NOTES
[facility]  Epilepsy Clinic Progress Note      [facility] - NEUROLOGY 7TH FL  [facility], [place]    Date: 3/15/24  Patient Name: Aureliano Wilson   MRN: [id number]   PCP: Shakeel Walton III  Referring Provider: No ref. provider found    Assessment:   Aureliano Wilson is a 64 y.o. male presenting for follow up for focal epilepsy. Well controlled on Keppra 750 mg bid.  Continue current dose.  Patient advised he may resume driving given seizure freedom >6 months.     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 I recommended seizure precautions with regards to avoiding unsupervised water recreational activity or bathing in tubs, climbing or working at heights, operation of heavy or dangerous machinery, caution around fire and sources of high heat, as well as any other activity which could put a patient at danger in case of a seizure.  I also reviewed the [facility] law and recommended that the patient not drive for 6 months in the event of breakthrough seizures.  As of this visit the patient can drive as he has been seizure free for >6 months.     Luiza Bergeron MD  [facility]   Department of Neurology    Patient note was created using MModal Dictation.  Any errors in syntax or even information may not have been identified and edited on initial review prior to signing this note.  Subjective:          Mr. Aureliano Wilson is a 64 y.o. male returns to clinic for continued management of epilepsy, focal.     Interval history:  Plan at time of last clinic visit was increase Keppra to 750 mg twice a day.  He is tolerating ok ,does note he is more tired on the higher dose.    Starts w/ nauseous feeling, leg will stiffen up and LUE.  Since the last visit,  patient reports seizures have decreased significantly.    He has been doing well.  Last seizure in august 2023.     HPI  Per Dr Vela:  Patient reports sound July 10, 2023, he experienced his 1st episode of sudden onset tonic posturing of the left upper extremity with flexion at the elbow and wrist toward the chest and tonic extension of the left lower extremity causing him to be unable to move the limbs for an estimated 1-2 minutes.  Over this time frame, muscles were engaged to the point of pain but no rhythmic movements or jerking.  When the event ended, limbs gradually but quickly relaxed and he was able to once again.  Very sore muscles afterwards.  Also felt very tired.  No loss of consciousness or other symptoms involved.  No bowel/bladder incontinence or tongue biting.  After seeking emergency evaluation in the urgent care setting, Neurology was reportedly consulted by phone by the facility.  Advised that they begin Keppra and initiate neurology referral for further case definition.    Patient has been taking Keppra 500 mg twice daily with no noted side effects outside of some mild fatigue.  He continued to have symptoms over the following days and highlights July 20th as having more than 3 independent episodes over the course of the day.  He is had no further episodes of this type since August 18, 2023.       Presents today for further evaluation.  MRI epilepsy protocol with/without contrast has been completed with no significant/contributory findings.  Just small-vessel ischemia.  No evidence of large strokes in the past.     No personal history is seizures prior to this event.  No history of head trauma or neurological infection.  No history of recent illness before onset of symptoms.  No similar episodes in the past.     No EEG completed at this time.  Patient is not driving as a precaution..    Prior Studies:  9/20/23: normal EEG    PAST MEDICAL HISTORY:  Past Medical History:   Diagnosis Date     "Erectile dysfunction        PAST SURGICAL HISTORY:  Past Surgical History:   Procedure Laterality Date    COLONOSCOPY N/A 6/5/2017    Procedure: COLONOSCOPY;  Surgeon: Tung Cochran MD;  Location: [facility];  Service: Endoscopy;  Laterality: N/A;       CURRENT MEDS:  Current Outpatient Medications   Medication Sig Dispense Refill    levETIRAcetam (KEPPRA) 750 MG Tab Take 1 tablet (750 mg total) by mouth 2 (two) times daily. 60 tablet 11     No current facility-administered medications for this visit.       ALLERGIES:  Review of patient's allergies indicates:  No Known Allergies    FAMILY HISTORY:  Family History   Problem Relation Age of Onset    Diabetes Father     Stroke Father     Diabetes Mother        SOCIAL HISTORY:  Social History     Tobacco Use    Smoking status: Every Day     Current packs/day: 0.25     Average packs/day: 0.3 packs/day for 30.0 years (7.5 ttl pk-yrs)     Types: Cigarettes    Smokeless tobacco: Current    Tobacco comments:     6 cigarettes a day   Substance Use Topics    Alcohol use: Yes     Alcohol/week: 2.0 standard drinks of alcohol     Types: 2 Glasses of wine per week     Comment: socially    Drug use: No       Review of Systems:  12 system review of systems is negative except for the symptoms mentioned in HPI.      Objective:     Vitals:    03/15/24 0904   BP: (!) 141/93   Pulse: 83   Weight: 92.8 kg (204 lb 9.4 oz)   Height: 5' 11" (1.803 m)     General: NAD, well nourished   Eyes: no tearing, discharge, no erythema   ENT: moist mucous membranes of the oral cavity, nares patent    Neck: Supple, full range of motion  Cardiovascular: Warm and well perfused, pulses equal and symmetrical  Lungs: Normal work of breathing, normal chest wall excursions  Skin: No rash, lesions, or breakdown on exposed skin  Psychiatry: Mood and affect are appropriate   Abdomen: soft, non tender, non distended  Extremeties: No cyanosis, clubbing or edema.    Neurological   MENTAL STATUS: Alert and " oriented to person, place, and time. Attention and concentration within normal limits. Speech without dysarthria, able to name and repeat without difficulty. Recent and remote memory within normal limits   CRANIAL NERVES: Visual fields intact. PERRL. EOMI. Facial sensation intact. Face symmetrical. Hearing grossly intact. Full shoulder shrug bilaterally. Tongue protrudes midline   SENSORY: Sensation is intact to light touch throughout.  Joint position perception intact. Negative Romberg.   MOTOR: Normal bulk and tone. No pronator drift.  5/5 deltoid, biceps, triceps, interosseous, hand  bilaterally. 5/5 iliopsoas, knee extension/flexion, foot dorsi/plantarflexion bilaterally.    REFLEXES: Symmetric and 2+ throughout. Toes down going bilaterally.   CEREBELLAR/COORDINATION/GAIT: Gait steady with normal arm swing and stride length.  Heel to shin intact. Finger to nose intact. Normal rapid alternating movements.

## 2024-04-03 ENCOUNTER — CLINICAL SUPPORT (OUTPATIENT)
Dept: REHABILITATION | Facility: HOSPITAL | Age: 64
End: 2024-04-03
Payer: COMMERCIAL

## 2024-04-03 DIAGNOSIS — Z74.09 IMPAIRED FUNCTIONAL MOBILITY, BALANCE, GAIT, AND ENDURANCE: Primary | ICD-10-CM

## 2024-04-03 PROCEDURE — 97530 THERAPEUTIC ACTIVITIES: CPT | Mod: PN,CQ

## 2024-04-03 PROCEDURE — 97110 THERAPEUTIC EXERCISES: CPT | Mod: PN,CQ

## 2024-04-03 NOTE — PROGRESS NOTES
"[facility]   Physical Therapy Treatment Note      Name: Aureliano Wilson  Clinic Number: [id number]    Therapy Diagnosis:   Encounter Diagnosis   Name Primary?    Impaired functional mobility, balance, gait, and endurance Yes     Physician: Shakeel Walton III, MD    Visit Date: 4/3/2024    Physician Orders: PT Eval and Treat   Medical Diagnosis from Referral: Gait instability [R26.81], Muscle weakness (generalized) [M62.81]   Evaluation Date: 3/7/2024  Authorization Period Expiration: 12/31/2024  Plan of Care Expiration: 5/3/2024  Progress Note Due: 4/7/2024  Date of Surgery: N/A  Visit # / Visits authorized: 1/20 (+eval)  FOTO: 1/3     Precautions: Standard and Seizure     Time In: 4:00 PM   Time Out: 4:45PM  Total Billable Time: 45 minutes (2 TE, 1 TA)    PTA Visit #: 1/5     Subjective     Patient reports: feeling fatigued but otherwise no complaints.  He  will be  compliant with home exercise program.  Response to previous treatment: initial eval  Functional change: ongoing    Pain: 0/10  Location: NA      Objective      Objective Measures updated at progress report unless specified.     Treatment     Aureliano received the treatments listed below:      therapeutic exercises to develop strength, endurance, ROM, and flexibility for 30 minutes including:  Nustep level 3.0 for 8 minutes (no rest breaks today)  Hamstring stretch: 2x30" B  Hip flexor stretch: 2x30" B  Calf raises: 2x20  Toe raises: 2x20  Standing hip abduction: 2x10 B with yellow theraband                 hip extension: 2x10 B with yellow theraband                 Hip flexion: 2x10 B with yellow theraband  Lateral steps with yellow theraband: 6 lengths x 10 feet     neuromuscular re-education activities to improve: Balance, Coordination, Kinesthetic, and Proprioception for 00 minutes. The following activities were included:  Not performed today    therapeutic activities to improve functional performance for 10 minutes, " "including:  Step ups; 4" forward: 3x45"                      Lateral up and over x 3x45"  Sit<>stand: 2x10 no UE support  6 minute walk test: 659 feet  (no rest breaks)      Patient Education and Home Exercises       Education provided:   - need to take standing breaks when sitting for long periods of time at work  - ankle pumps as well as elevating legs to manage swelling    Written Home Exercises Provided: yes. Exercises were reviewed and Aureliano was able to demonstrate them prior to the end of the session.  Aureliano demonstrated good  understanding of the education provided. See Electronic Medical Record under Patient Instructions for exercises provided during therapy sessions    Assessment     Aureliano arrived to session without any complaints of pain but was agreeable to treatment.  Fair tolerance of treatment with high levels of fatigue reported and multiple seated rest breaks for recovery.  Session focused on self stretching, hip strengthening, endurance based activity.  He was able to complete 6 minute walk test today without any rest breaks for a total of 659 feet.  He reported an increase in energy levels following session and denied any pain.  Patient would benefit from continued PT services to improve endurance and strength deficits.    Aureliano Is progressing well towards his goals.   Patient prognosis is Good.     Patient will continue to benefit from skilled outpatient physical therapy to address the deficits listed in the problem list box on initial evaluation, provide pt/family education and to maximize pt's level of independence in the home and community environment.     Patient's spiritual, cultural and educational needs considered and pt agreeable to plan of care and goals.     Anticipated barriers to physical therapy: work commitments/schedule access     Goals:   Short Term Goals: 4 weeks   Patient will be compliant with HEP in order to maximize PT benefits  Patient will score >/= 40% on FOTO ABC " survey in order to improve self-perception of functional mobility   Patient will score >/= 10 repetitions on 30-Second Sit to  order to improve bilateral lower extremity endurance and muscular power for transfers   Patient will complete and walk >/= 800 feet on Six-Minute Walk Test in order to improve endurance for community mobility      Long Term Goals: 8 weeks   Patient will score >/= 60% on FOTO ABC survey in order to improve self-perception of functional mobility   Patient will improve bilateral lower extremity MMT grades by >/=1/2 grade in order to improve strength for ADL completion  Patient will score >/= 12 repetitions on 30-Second Sit to  order to improve bilateral lower extremity endurance and muscular power for transfers   Patient will complete and walk >/= 1000 feet on Six-Minute Walk Test in order to improve endurance for community mobility   Patient will begin some form of home/community fitness in order to sustain progress gained in PT    Plan     Plan to cont with progression of PT goals per POC.    Paige Montgomery, PTA

## 2024-04-08 ENCOUNTER — CLINICAL SUPPORT (OUTPATIENT)
Dept: REHABILITATION | Facility: HOSPITAL | Age: 64
End: 2024-04-08
Payer: COMMERCIAL

## 2024-04-08 DIAGNOSIS — Z74.09 IMPAIRED FUNCTIONAL MOBILITY, BALANCE, GAIT, AND ENDURANCE: Primary | ICD-10-CM

## 2024-04-08 DIAGNOSIS — M62.81 MUSCLE WEAKNESS (GENERALIZED): ICD-10-CM

## 2024-04-08 DIAGNOSIS — R26.81 GAIT INSTABILITY: ICD-10-CM

## 2024-04-08 PROCEDURE — 97530 THERAPEUTIC ACTIVITIES: CPT | Mod: PN

## 2024-04-08 PROCEDURE — 97112 NEUROMUSCULAR REEDUCATION: CPT | Mod: PN

## 2024-04-08 PROCEDURE — 97110 THERAPEUTIC EXERCISES: CPT | Mod: PN

## 2024-04-08 NOTE — PATIENT INSTRUCTIONS
Please walk in safe environments without treacherous ground and walk with a family member if you have balance or CV concerns.  Walk when the weather permits at the cool hours of the day in the summer and during rainy seasons.  PT recommends a HR monitor or pedometer to track levels of fitness daily and weekly.  Please do not exceed heart rate max when walking.  See AHA for Heart rate range recommendations for someone your age.

## 2024-04-08 NOTE — PROGRESS NOTES
"[facility]   Physical Therapy Treatment Note      Name: Aureliano Wilson  Clinic Number: [id number]    Therapy Diagnosis:   Encounter Diagnoses   Name Primary?    Impaired functional mobility, balance, gait, and endurance Yes    Gait instability     Muscle weakness (generalized)      Physician: Shakeel Walton III, MD    Visit Date: 4/8/2024    Physician Orders: PT Eval and Treat   Medical Diagnosis from Referral: Gait instability [R26.81], Muscle weakness (generalized) [M62.81]   Evaluation Date: 3/7/2024  Authorization Period Expiration: 12/31/2024  Plan of Care Expiration: 5/3/2024  Progress Note Due: 4/7/2024  Date of Surgery: N/A  Visit # / Visits authorized: 3/20 (+eval)  FOTO: 2/3     Precautions: Standard and Seizure     Time In: 3:53 PM   Time Out: 4:44 PM  Total Billable Time: 51 minutes (1 TE, 1TA, 1NMR)    PTA Visit #: 1/5     Subjective     Patient reports: that he "is not doing well at all".  He stated that he went to the [employer] yesterday and did not move around well in there and felt exhausted and unsteady.  He also stated that he is tired all the time and feeling weak and have trouble lifting his legs.  He also stated that he has been using the wheelchair service at the airport when traveling for work "because I dont want to fall going to my terminal"  He  will be  compliant with home exercise program.  Response to previous treatment: no complaints  Functional change: ongoing    Pain: 0/10  Location: NA      Objective      Objective Measures updated at progress report unless specified.     Treatment     Aureliano received the treatments listed below:      therapeutic exercises to develop strength, endurance, ROM, and flexibility for 10 minutes including:  Nustep level 4.0 for 8 minutes (no rest breaks today)  Lateral steps with red theraband: 6 lengths x 10 feet     Not performed today  Hamstring stretch: 2x30" B  Hip flexor stretch: 2x30" B  Calf raises: 2x20  Toe raises: " "2x20  Standing hip abduction: 2x10 B with yellow theraband                 hip extension: 2x10 B with yellow theraband                 Hip flexion: 2x10 B with yellow theraband    neuromuscular re-education activities to improve: Balance, Coordination, Kinesthetic, and Proprioception for 20 minutes. The following activities were included:  -- cone taps (6)   2 lap in open gym with contact guard assist as needed  -- high knee slaps  2 x 40'' in open gym with supervision  -- hurdles (4)   6'' 4 trials forward with ~80% accuracy in stepping with reciprocal gait in open gym      6'' 2 trials each way with no errors in open gym    therapeutic activities to improve functional performance for 22 minutes, including:  Backward walking  2 x 40'' in open gym  Step ups; 6" forward: 2 x 10 B with UE support as needed from / bar                      Lateral up and over x 3x45" with UE support as needed from / bar  Sit<>stand: 2x10 no UE support + 3 lb med ball   6 minute walk test: 1044 feet  (no rest breaks)    Patient Education and Home Exercises       Education provided:   - need to performed updated HEP for LE strengthening 2x/day and walking program weekly as recommended to improve activity tolerance.     Written Home Exercises Provided: yes. Exercises were reviewed and Aureliano was able to demonstrate them prior to the end of the session.  Aureliano demonstrated good  understanding of the education provided. See Electronic Medical Record under Patient Instructions for exercises provided during therapy sessions    Assessment     Aureliano arrived to session without any complaints of pain but was agreeable to treatment.  He seems to be limited by fear of mobility and he self-limits during performance of most tasks when there is no physiological reason for his perception of unsuccessful and extreme difficulty of performance.  With coaxing and rest breaks he was able to complete several balance and exercise training progressions with " "minimal loss of balance and less UE support. He was able to complete 6 minute walk test today without any rest breaks for a >1000 feet.  He benefited from positive reinforcement of abilities today and was able to do more during today's session than the last. He reported an increase in energy levels following session stating "I feel like I could take off running" and denied any pain.  Patient would benefit from continued PT services to improve endurance and strength deficits.    Aureliano Is progressing well towards his goals.   Patient prognosis is Good.     Patient will continue to benefit from skilled outpatient physical therapy to address the deficits listed in the problem list box on initial evaluation, provide pt/family education and to maximize pt's level of independence in the home and community environment.     Patient's spiritual, cultural and educational needs considered and pt agreeable to plan of care and goals.     Anticipated barriers to physical therapy: work commitments/schedule access     Goals:   Short Term Goals: 4 weeks   Patient will be compliant with HEP in order to maximize PT benefits  Patient will score >/= 40% on FOTO ABC survey in order to improve self-perception of functional mobility   Patient will score >/= 10 repetitions on 30-Second Sit to  order to improve bilateral lower extremity endurance and muscular power for transfers   Patient will complete and walk >/= 800 feet on Six-Minute Walk Test in order to improve endurance for community mobility      Long Term Goals: 8 weeks   Patient will score >/= 60% on FOTO ABC survey in order to improve self-perception of functional mobility   Patient will improve bilateral lower extremity MMT grades by >/=1/2 grade in order to improve strength for ADL completion  Patient will score >/= 12 repetitions on 30-Second Sit to  order to improve bilateral lower extremity endurance and muscular power for transfers   Patient will complete and " walk >/= 1000 feet on Six-Minute Walk Test in order to improve endurance for community mobility   Patient will begin some form of home/community fitness in order to sustain progress gained in PT    Plan     Progress to all standing training, higher level balance training and functional level training to improve activity tolerance and balance confidence.    Patient may benefit from behavioral therapy is fear and anxiety about activity increases persist.     Vera Grace, PT

## 2024-04-17 ENCOUNTER — CLINICAL SUPPORT (OUTPATIENT)
Dept: REHABILITATION | Facility: HOSPITAL | Age: 64
End: 2024-04-17
Payer: COMMERCIAL

## 2024-04-17 DIAGNOSIS — Z74.09 IMPAIRED FUNCTIONAL MOBILITY, BALANCE, GAIT, AND ENDURANCE: Primary | ICD-10-CM

## 2024-04-17 PROCEDURE — 97530 THERAPEUTIC ACTIVITIES: CPT | Mod: PN

## 2024-04-17 PROCEDURE — 97112 NEUROMUSCULAR REEDUCATION: CPT | Mod: PN

## 2024-04-17 NOTE — PROGRESS NOTES
"[facility]   Physical Therapy Treatment Note      Name: Aureliano Wilson  Clinic Number: [id number]    Therapy Diagnosis:   Encounter Diagnosis   Name Primary?    Impaired functional mobility, balance, gait, and endurance Yes     Physician: Shakeel Walton III, MD    Visit Date: 4/17/2024    Physician Orders: PT Eval and Treat   Medical Diagnosis from Referral: Gait instability [R26.81], Muscle weakness (generalized) [M62.81]   Evaluation Date: 3/7/2024  Authorization Period Expiration: 12/31/2024  Plan of Care Expiration: 5/3/2024  Progress Note Due: last assessed 4/8/2024  Date of Surgery: N/A  Visit # / Visits authorized: 3/20 (+eval)  FOTO: 2/3     Precautions: Standard and Seizure     Time In: 4:07 PM   Time Out: 4:45 PM  Total Billable Time: 38 minutes (1TA, 2NMR)    PTA Visit #: 1/5     Subjective     Patient reports: Has been doing some exercise at home but not as much as prescribed.   He  was partially  compliant with home exercise program.  Response to previous treatment: no complaints  Functional change: ongoing    Pain: 0/10  Location: NA      Objective      Objective Measures updated at progress report unless specified.     Treatment     Aureliano received the treatments listed below:      therapeutic exercises to develop strength, endurance, ROM, and flexibility for 00 minutes including:    neuromuscular re-education activities to improve: Balance, Coordination, Kinesthetic, and Proprioception for 23 minutes. The following activities were included:  -- cone taps (6) + lateral stepping in open gym x 4 lengths x 15 feet each; SBA  -- modified single leg stance with retro leg on green foam disc; top leg on 6" step + volleyball chest press 2x15 each leg leading  -- isometric straight arm pulldown red theraband + hip flexion marches 3x30"  -- calf raises 2x15    therapeutic activities to improve functional performance for 15 minutes, including:  -- treadmill forward walking 3 minutes + 3 " "minutes with one brief standing rest break; speed setting 1.3  -- sit<>stand: 2x10 no upper extremity support + 2kg med ball shoulder press  -- forward step ups to 6" step + contralateral hip  2x45" each leg leading; 1.5# ankle weights      Patient Education and Home Exercises       Education provided:   - need to performed updated HEP for LE strengthening 2x/day and walking program weekly as recommended to improve activity tolerance.     Written Home Exercises Provided: yes. Exercises were reviewed and Aureliano was able to demonstrate them prior to the end of the session.  Aureliano demonstrated good  understanding of the education provided. See Electronic Medical Record under Patient Instructions for exercises provided during therapy sessions    Assessment     Aureliano tolerated progressions in today's program with appropriate fatigue response. Tends to struggle with accomplishing consistent left heel strike with treadmill training. Improved anticipatory postural control noted with cone tap activity and no external assist from PT required today. He would benefit from continued general strength, conditioning, and balance program to achieve personal and functional goals, but ultimate success in PT likely contingent on better HEP compliance (which was emphasized today).    Aureliano Is progressing well towards his goals.   Patient prognosis is Good.     Patient will continue to benefit from skilled outpatient physical therapy to address the deficits listed in the problem list box on initial evaluation, provide pt/family education and to maximize pt's level of independence in the home and community environment.     Patient's spiritual, cultural and educational needs considered and pt agreeable to plan of care and goals.     Anticipated barriers to physical therapy: work commitments/schedule access     Goals:   Short Term Goals: 4 weeks   Patient will be compliant with HEP in order to maximize PT benefits  Patient will " score >/= 40% on FOTO ABC survey in order to improve self-perception of functional mobility   Patient will score >/= 10 repetitions on 30-Second Sit to  order to improve bilateral lower extremity endurance and muscular power for transfers   Patient will complete and walk >/= 800 feet on Six-Minute Walk Test in order to improve endurance for community mobility (met)     Long Term Goals: 8 weeks   Patient will score >/= 60% on FOTO ABC survey in order to improve self-perception of functional mobility   Patient will improve bilateral lower extremity MMT grades by >/=1/2 grade in order to improve strength for ADL completion  Patient will score >/= 12 repetitions on 30-Second Sit to  order to improve bilateral lower extremity endurance and muscular power for transfers   Patient will complete and walk >/= 1000 feet on Six-Minute Walk Test in order to improve endurance for community mobility (met)  Patient will begin some form of home/community fitness in order to sustain progress gained in PT    Plan     Continue standing exercise, higher level balance training and functional level training to improve activity tolerance and balance confidence.    AMARA KIRKLAND, PT

## 2024-04-18 ENCOUNTER — CLINICAL SUPPORT (OUTPATIENT)
Dept: REHABILITATION | Facility: HOSPITAL | Age: 64
End: 2024-04-18
Payer: COMMERCIAL

## 2024-04-18 DIAGNOSIS — Z74.09 IMPAIRED FUNCTIONAL MOBILITY, BALANCE, GAIT, AND ENDURANCE: Primary | ICD-10-CM

## 2024-04-18 PROCEDURE — 97110 THERAPEUTIC EXERCISES: CPT | Mod: PN

## 2024-04-18 PROCEDURE — 97530 THERAPEUTIC ACTIVITIES: CPT | Mod: PN

## 2024-04-18 PROCEDURE — 97112 NEUROMUSCULAR REEDUCATION: CPT | Mod: PN

## 2024-04-18 NOTE — PROGRESS NOTES
"[facility]   Physical Therapy Treatment Note      Name: Aureliano Wilson  Clinic Number: [id number]    Therapy Diagnosis:   Encounter Diagnosis   Name Primary?    Impaired functional mobility, balance, gait, and endurance Yes     Physician: Shakeel Walton III, MD    Visit Date: 4/18/2024    Physician Orders: PT Eval and Treat   Medical Diagnosis from Referral: Gait instability [R26.81], Muscle weakness (generalized) [M62.81]   Evaluation Date: 3/7/2024  Authorization Period Expiration: 12/31/2024  Plan of Care Expiration: 5/3/2024  Progress Note Due: last assessed 4/8/2024  Date of Surgery: N/A  Visit # / Visits authorized: 4/20 (+eval)  FOTO: 2/3     Precautions: Standard and Seizure     Time In: 4:03 PM   Time Out: 4:45 PM  Total Billable Time: 42 minutes (1TA, 1NMR, 1TE)    PTA Visit #: 0/5     Subjective     Patient reports: Very sore and fatigued from appointment yesterday.  He  was partially  compliant with home exercise program.  Response to previous treatment: no complaints  Functional change: ongoing    Pain: 0/10  Location: NA      Objective      Objective Measures updated at progress report unless specified.     Treatment     Aureilano received the treatments listed below:      therapeutic exercises to develop strength, endurance, ROM, and flexibility for 13 minutes including:  -- hamstring stretch at stairs 2x45" each leg  -- standing hip abduction 2x12 each leg red theraband; stance leg on green foam  -- standing hip extension 2x12 each leg red theraband; stance leg on green foam    neuromuscular re-education activities to improve: Balance, Coordination, Kinesthetic, and Proprioception for 15 minutes. The following activities were included:  -- cone taps (6) + lateral stepping in open gym x 2 lengths x 25 feet each; SBA  -- modified single leg stance with retro leg on Airex; top leg on 6" step + red med ball chest press 2x15 each leg leading  -- isometric straight arm pulldown red " "theraband + hip flexion marches 3x30"    therapeutic activities to improve functional performance for 15 minutes, including:  -- treadmill side stepping x 3 minutes each direction with bilateral upper extremity support; speed setting 0.4  -- farmer's carries x450' with 6# dumbbells in each hand   -- reciprocal forward step ups to 6" step holding 6# dumbbells in each hand x12 each leg    Patient Education and Home Exercises       Education provided:   - need to performed updated HEP for LE strengthening 2x/day and walking program weekly as recommended to improve activity tolerance.     Written Home Exercises Provided: yes. Exercises were reviewed and Aureliano was able to demonstrate them prior to the end of the session.  Aureliano demonstrated good  understanding of the education provided. See Electronic Medical Record under Patient Instructions for exercises provided during therapy sessions    Assessment     Aureliano completed session with appropriate levels of fatigue and no loss of balance. Occasional verbal cuing required for postural awareness with standing activity. Encouragement provided throughout session to minimize excessive rest breaks to maximize effect of PT visit. He would benefit from continued PT services to achieve functional goals.    Aureliano Is progressing well towards his goals.   Patient prognosis is Good.     Patient will continue to benefit from skilled outpatient physical therapy to address the deficits listed in the problem list box on initial evaluation, provide pt/family education and to maximize pt's level of independence in the home and community environment.     Patient's spiritual, cultural and educational needs considered and pt agreeable to plan of care and goals.     Anticipated barriers to physical therapy: work commitments/schedule access     Goals:   Short Term Goals: 4 weeks   Patient will be compliant with HEP in order to maximize PT benefits  Patient will score >/= 40% on FOTO ABC " survey in order to improve self-perception of functional mobility   Patient will score >/= 10 repetitions on 30-Second Sit to  order to improve bilateral lower extremity endurance and muscular power for transfers   Patient will complete and walk >/= 800 feet on Six-Minute Walk Test in order to improve endurance for community mobility (met)     Long Term Goals: 8 weeks   Patient will score >/= 60% on FOTO ABC survey in order to improve self-perception of functional mobility   Patient will improve bilateral lower extremity MMT grades by >/=1/2 grade in order to improve strength for ADL completion  Patient will score >/= 12 repetitions on 30-Second Sit to  order to improve bilateral lower extremity endurance and muscular power for transfers   Patient will complete and walk >/= 1000 feet on Six-Minute Walk Test in order to improve endurance for community mobility (met)  Patient will begin some form of home/community fitness in order to sustain progress gained in PT    Plan     Continue standing exercise, higher level balance training and functional level training to improve activity tolerance and balance confidence.    AMARA KIRKLAND, PT

## 2024-04-22 ENCOUNTER — CLINICAL SUPPORT (OUTPATIENT)
Dept: REHABILITATION | Facility: HOSPITAL | Age: 64
End: 2024-04-22
Payer: COMMERCIAL

## 2024-04-22 DIAGNOSIS — Z74.09 IMPAIRED FUNCTIONAL MOBILITY, BALANCE, GAIT, AND ENDURANCE: Primary | ICD-10-CM

## 2024-04-22 PROCEDURE — 97112 NEUROMUSCULAR REEDUCATION: CPT | Mod: PN

## 2024-04-22 PROCEDURE — 97530 THERAPEUTIC ACTIVITIES: CPT | Mod: PN

## 2024-04-22 NOTE — PROGRESS NOTES
"[facility]   Physical Therapy Treatment Note      Name: Aureliano Wilson  Clinic Number: [id number]    Therapy Diagnosis:   Encounter Diagnosis   Name Primary?    Impaired functional mobility, balance, gait, and endurance Yes     Physician: Shakeel Walton III, MD    Visit Date: 4/22/2024    Physician Orders: PT Eval and Treat   Medical Diagnosis from Referral: Gait instability [R26.81], Muscle weakness (generalized) [M62.81]   Evaluation Date: 3/7/2024  Authorization Period Expiration: 12/31/2024  Plan of Care Expiration: 5/3/2024  Progress Note Due: last assessed 4/8/2024  Date of Surgery: N/A  Visit # / Visits authorized: 5/20 (+eval)  FOTO: 1/3, next please if patient is on time     Precautions: Standard and Seizure     Time In: 4:07 PM   Time Out: 4:45 PM  Total Billable Time: 38 minutes (1TA, 2NMR)    PTA Visit #: 0/5     Subjective     Patient reports:   Patient's exercise did not begin until 4:12 because he was late and needed to visit the Men's restroom on entry..  He  was partially  compliant with home exercise program.  Response to previous treatment: no complaints  Functional change: ongoing    Pain: 0/10  Location: NA      Objective      Objective Measures updated at progress report unless specified.     Treatment     Aureliano received the treatments listed below:      therapeutic exercises to develop strength, endurance, ROM, and flexibility for 00 minutes including:  Not time to perform  -- hamstring stretch at stairs 2x45" each leg  -- standing hip abduction 2x12 each leg red theraband; stance leg on green foam  -- standing hip extension 2x12 each leg red theraband; stance leg on green foam    neuromuscular re-education activities to improve: Balance, Coordination, Kinesthetic, and Proprioception for 25 minutes. The following activities were included:  -- cone taps (6) + lateral stepping in open gym x 2 lengths x 25 feet each; SBA  -- modified single leg stance with retro leg on " "Airex; top leg on 6" step 2 x 30'' each leg leading  -- front cross stepping x 25' each way in gym  -- back cross stepping x 25' each way in gym  -- [place] walking 2 x 25'' each way in gym   -- walking high knee marches with beach ball for core engagement  -- dione stepping (9'' hurdles) forward x 2 laps      Lateral x 2 laps  NOT performed today  -- isometric straight arm pulldown red theraband + hip flexion marches 3x30"    therapeutic activities to improve functional performance for 8 minutes, including:  -- farmer's carries x450' with 6# dumbbells in each hand   -- reciprocal forward step ups to 6" step holding 6# dumbbells (in each hand x12 each leg) no weight this session.   -- sit to stands with yellow med ball 2 x 10 from standard chair    Not time to perform  -- treadmill forward walking-- treadmill side stepping x 3 minutes each direction with bilateral upper extremity support; speed setting 0.4    Patient Education and Home Exercises       Education provided:   - need to performed updated HEP for LE strengthening 2x/day and walking program weekly as recommended to improve activity tolerance.   -- walking program most day of the week    Written Home Exercises Provided: yes. Exercises were reviewed and Aureliano was able to demonstrate them prior to the end of the session.  Aureliano demonstrated good  understanding of the education provided. See Electronic Medical Record under Patient Instructions for exercises provided during therapy sessions    Assessment     Aureliano was starting the session but completed session no loss of balance. He requested 2 seated rests. Occasional verbal cuing required for postural awareness with standing activity. He needs to consistently perform his HEP to improve at a more efficient rate. He would benefit from continued PT services to achieve functional goals.    Aureliano Is progressing well towards his goals.   Patient prognosis is Good.     Patient will continue to benefit from " skilled outpatient physical therapy to address the deficits listed in the problem list box on initial evaluation, provide pt/family education and to maximize pt's level of independence in the home and community environment.     Patient's spiritual, cultural and educational needs considered and pt agreeable to plan of care and goals.     Anticipated barriers to physical therapy: work commitments/schedule access     Goals:   Short Term Goals: 4 weeks   Patient will be compliant with HEP in order to maximize PT benefits  Patient will score >/= 40% on FOTO ABC survey in order to improve self-perception of functional mobility   Patient will score >/= 10 repetitions on 30-Second Sit to  order to improve bilateral lower extremity endurance and muscular power for transfers   Patient will complete and walk >/= 800 feet on Six-Minute Walk Test in order to improve endurance for community mobility (met)     Long Term Goals: 8 weeks   Patient will score >/= 60% on FOTO ABC survey in order to improve self-perception of functional mobility   Patient will improve bilateral lower extremity MMT grades by >/=1/2 grade in order to improve strength for ADL completion  Patient will score >/= 12 repetitions on 30-Second Sit to  order to improve bilateral lower extremity endurance and muscular power for transfers   Patient will complete and walk >/= 1000 feet on Six-Minute Walk Test in order to improve endurance for community mobility (met)  Patient will begin some form of home/community fitness in order to sustain progress gained in PT    Plan     Continue standing exercise, higher level balance training and functional level training to improve activity tolerance and balance confidence.    Vera Grace, PT

## 2024-04-24 ENCOUNTER — CLINICAL SUPPORT (OUTPATIENT)
Dept: REHABILITATION | Facility: HOSPITAL | Age: 64
End: 2024-04-24
Payer: COMMERCIAL

## 2024-04-24 DIAGNOSIS — Z74.09 IMPAIRED FUNCTIONAL MOBILITY, BALANCE, GAIT, AND ENDURANCE: Primary | ICD-10-CM

## 2024-04-24 PROCEDURE — 97530 THERAPEUTIC ACTIVITIES: CPT | Mod: PN

## 2024-04-24 PROCEDURE — 97112 NEUROMUSCULAR REEDUCATION: CPT | Mod: PN

## 2024-04-24 PROCEDURE — 97110 THERAPEUTIC EXERCISES: CPT | Mod: PN

## 2024-04-24 NOTE — PROGRESS NOTES
"[facility]   Physical Therapy Treatment Note      Name: Aureliano Wilson  Clinic Number: [id number]    Therapy Diagnosis:   Encounter Diagnosis   Name Primary?    Impaired functional mobility, balance, gait, and endurance Yes     Physician: Shakeel Walton III, MD    Visit Date: 4/24/2024    Physician Orders: PT Eval and Treat   Medical Diagnosis from Referral: Gait instability [R26.81], Muscle weakness (generalized) [M62.81]   Evaluation Date: 3/7/2024  Authorization Period Expiration: 12/31/2024  Plan of Care Expiration: 5/3/2024  Progress Note Due: last assessed 4/8/2024  Date of Surgery: N/A  Visit # / Visits authorized: 6/20 (+eval)  FOTO: 1/3, next please if patient is on time     Precautions: Standard and Seizure     Time In: 4:12 PM   Time Out: 4:45 PM  Total Billable Time: 33 minutes (1TA, 1NMR, 1TE)    PTA Visit #: 0/5     Subjective     Patient reports: Feels great after he leaves therapy.  He  was partially  compliant with home exercise program.  Response to previous treatment: no complaints  Functional change: ongoing    Pain: 0/10  Location: NA      Objective      Objective Measures updated at progress report unless specified.     Treatment     Aureliano received the treatments listed below:      therapeutic exercises to develop strength, endurance, ROM, and flexibility for 8 minutes including:  -- Stepper x 6 minutes level 5 single peak  -- Hamstring stretch at stairs 2x30" each leg    neuromuscular re-education activities to improve: Balance, Coordination, Kinesthetic, and Proprioception for 15 minutes. The following activities were included:  -- [place] walking 2 x 45'' each way in gym   -- Walking high knee marches with 4.4# med ball cross over chop for core engagement 2x45'  -- Squats to 24" plyobox + shoulder press with 5# dumbbell each hand; 2x10     therapeutic activities to improve functional performance for 10 minutes, including:  -- Farmer's carries x450' with 5# " "dumbbells in each hand   -- Reciprocal step ups to dome side of Bosu + contralateral hip thruster 2x10 each leg leading; light touchdown support    Out of time today  -- cone taps (6) + lateral stepping in open gym x 2 lengths x 25 feet each; SBA  -- modified single leg stance with retro leg on Airex; top leg on 6" step 2 x 30'' each leg leading  -- dione stepping (9'' hurdles) forward x 2 laps      Lateral x 2 laps      Patient Education and Home Exercises       Education provided:   - need to performed updated HEP for LE strengthening 2x/day and walking program weekly as recommended to improve activity tolerance.   -- walking program most day of the week    Written Home Exercises Provided: yes. Exercises were reviewed and Aureliano was able to demonstrate them prior to the end of the session.  Aureliano demonstrated good  understanding of the education provided. See Electronic Medical Record under Patient Instructions for exercises provided during therapy sessions    Assessment     Aureliano is steadily improving in terms of functional endurance. Shortened session due to patient's late arrival. Only one seated rest break required today and decreased need for verbal cuing to improve postural awareness with standing activity. He would benefit from continued PT services to achieve functional goals.    Aureliano Is progressing well towards his goals.   Patient prognosis is Good.     Patient will continue to benefit from skilled outpatient physical therapy to address the deficits listed in the problem list box on initial evaluation, provide pt/family education and to maximize pt's level of independence in the home and community environment.     Patient's spiritual, cultural and educational needs considered and pt agreeable to plan of care and goals.     Anticipated barriers to physical therapy: work commitments/schedule access     Goals:   Short Term Goals: 4 weeks   Patient will be compliant with HEP in order to maximize PT " benefits  Patient will score >/= 40% on FOTO ABC survey in order to improve self-perception of functional mobility   Patient will score >/= 10 repetitions on 30-Second Sit to  order to improve bilateral lower extremity endurance and muscular power for transfers   Patient will complete and walk >/= 800 feet on Six-Minute Walk Test in order to improve endurance for community mobility (met)     Long Term Goals: 8 weeks   Patient will score >/= 60% on FOTO ABC survey in order to improve self-perception of functional mobility   Patient will improve bilateral lower extremity MMT grades by >/=1/2 grade in order to improve strength for ADL completion  Patient will score >/= 12 repetitions on 30-Second Sit to  order to improve bilateral lower extremity endurance and muscular power for transfers   Patient will complete and walk >/= 1000 feet on Six-Minute Walk Test in order to improve endurance for community mobility (met)  Patient will begin some form of home/community fitness in order to sustain progress gained in PT    Plan     Continue standing exercise, higher level balance training and functional level training to improve activity tolerance and balance confidence.    AMARA KIRKLAND, PT

## 2024-04-29 ENCOUNTER — CLINICAL SUPPORT (OUTPATIENT)
Dept: REHABILITATION | Facility: HOSPITAL | Age: 64
End: 2024-04-29
Payer: COMMERCIAL

## 2024-04-29 DIAGNOSIS — Z74.09 IMPAIRED FUNCTIONAL MOBILITY, BALANCE, GAIT, AND ENDURANCE: Primary | ICD-10-CM

## 2024-04-29 PROCEDURE — 97530 THERAPEUTIC ACTIVITIES: CPT | Mod: PN

## 2024-04-29 PROCEDURE — 97112 NEUROMUSCULAR REEDUCATION: CPT | Mod: PN

## 2024-04-29 NOTE — PROGRESS NOTES
"[facility]   Physical Therapy Treatment Note      Name: Aureliano Wilson  Clinic Number: [id number]    Therapy Diagnosis:   Encounter Diagnosis   Name Primary?    Impaired functional mobility, balance, gait, and endurance Yes     Physician: Shakeel Walton III, MD    Visit Date: 4/29/2024    Physician Orders: PT Eval and Treat   Medical Diagnosis from Referral: Gait instability [R26.81], Muscle weakness (generalized) [M62.81]   Evaluation Date: 3/7/2024  Authorization Period Expiration: 12/31/2024  Plan of Care Expiration: 5/3/2024  Progress Note Due: last assessed 4/8/2024  Date of Surgery: N/A  Visit # / Visits authorized: 7/20 (+eval)  FOTO: No need to reissue FOTO performed on 4/30/2024     Precautions: Standard and Seizure     Time In: 4:02 PM   Time Out: 4:40 PM  Total Billable Time: 38 minutes (2TA, 1NMR)    PTA Visit #: 0/5     Subjective     Patient reports: that he has good days and bad days and he can predict when he will have good days vs bad days.  He stated that today was a bad day for his legs to not be working well but could not explain why.  He  was partially  compliant with home exercise program.  Response to previous treatment: no complaints  Functional change: ongoing    Pain: 0/10  Location: NA      Objective      Objective Measures updated at progress report unless specified.     Treatment     Aureliano received the treatments listed below:      therapeutic exercises to develop strength, endurance, ROM, and flexibility for 3 minutes including:  -- Hamstring stretch at stairs 2x30" each leg    neuromuscular re-education activities to improve: Balance, Coordination, Kinesthetic, and Proprioception for 15 minutes. The following activities were included:  -- [place] walking 2 x 45'' each way in gym   -- Walking high knee marches with 4.4# med ball cross over chop for core engagement 2x45'  -- modified single leg stance with retro leg on Airex; top leg on 6" step 2 x 30'' each " "leg leading  -- dione stepping (9'' hurdles) forward x 2 laps      Lateral x 2 lap   Not performed today  -- Squats to 24" plyobox + shoulder press with 5# dumbbell each hand; 2x10     therapeutic activities to improve functional performance for 20 minutes, including  -- TREADMILL walking x 2.5 minutes at 1.4-1.6 m/s with patient having to abruptly stop walking with no ability to articulate why but he hopped off the back of the treadmill while it was moving.  "I just had to stop"  -- 10 minute walk without LOB with normal coordinated gait  -- stair navigation 4 x 5 without UE support  -- Reciprocal step ups to dome side of Bosu + contralateral hip thruster 2x10 each leg leading; light touchdown support    NOT performed today  -- Farmer's carries x450' with 5# dumbbells in each hand   -- cone taps (6) + lateral stepping in open gym x 2 lengths x 25 feet each; SBA      Patient Education and Home Exercises       Education provided:   - need to performed updated HEP for LE strengthening 2x/day and walking program weekly as recommended to improve activity tolerance.   -- walking program most day of the week    Written Home Exercises Provided: yes. Exercises were reviewed and Aureliano was able to demonstrate them prior to the end of the session.  Aureliano demonstrated good  understanding of the education provided. See Electronic Medical Record under Patient Instructions for exercises provided during therapy sessions    Assessment     Aureliano is steadily improving in terms of functional endurance and balance. He show signs of behavior self limitation that limits some progression but in spite of this he is still on track to met goals and discharge safely to a gym next visit.  He stopped walking while treadmill was moving during today's visit but no fall or LOB occurred.  When he request to not continue training during session he is usually unable to give a physiological reason for wanting to defer or stop a training task so PT " progresses patient through this behavior.     Aureliano Is progressing well towards his goals.   Patient prognosis is Good.     Patient will continue to benefit from skilled outpatient physical therapy to address the deficits listed in the problem list box on initial evaluation, provide pt/family education and to maximize pt's level of independence in the home and community environment.     Patient's spiritual, cultural and educational needs considered and pt agreeable to plan of care and goals.     Anticipated barriers to physical therapy: work commitments/schedule access     Goals:   Short Term Goals: 4 weeks   Patient will be compliant with HEP in order to maximize PT benefits Met  Patient will score >/= 40% on FOTO ABC survey in order to improve self-perception of functional mobility Met  Patient will score >/= 10 repetitions on 30-Second Sit to  order to improve bilateral lower extremity endurance and muscular power for transfers  (PROGRESSING, NOT MET)  Patient will complete and walk >/= 800 feet on Six-Minute Walk Test in order to improve endurance for community mobility (met)     Long Term Goals: 8 weeks   Patient will score >/= 60% on FOTO ABC survey in order to improve self-perception of functional mobility  (PROGRESSING, NOT MET)  Patient will improve bilateral lower extremity MMT grades by >/=1/2 grade in order to improve strength for ADL completion   Patient will score >/= 12 repetitions on 30-Second Sit to  order to improve bilateral lower extremity  endurance and muscular power for transfers (PROGRESSING, NOT MET)  Patient will complete and walk >/= 1000 feet on Six-Minute Walk Test in order to improve endurance for community mobility (met)  Patient will begin some form of home/community fitness in order to sustain progress gained in PT Met walking program and safe for community fitness at gym    Plan     Reassess and Discharge next visit with gym recommendation and possible  recommendation for behavioral health consult    Vera Grace, PT

## 2024-05-01 ENCOUNTER — CLINICAL SUPPORT (OUTPATIENT)
Dept: REHABILITATION | Facility: HOSPITAL | Age: 64
End: 2024-05-01
Payer: COMMERCIAL

## 2024-05-01 DIAGNOSIS — Z74.09 IMPAIRED FUNCTIONAL MOBILITY, BALANCE, GAIT, AND ENDURANCE: Primary | ICD-10-CM

## 2024-05-01 PROCEDURE — 97530 THERAPEUTIC ACTIVITIES: CPT | Mod: PN

## 2024-05-01 PROCEDURE — 97110 THERAPEUTIC EXERCISES: CPT | Mod: PN

## 2024-05-01 NOTE — PROGRESS NOTES
[facility]   Physical Therapy Treatment Note / Discharge Summary     Name: Aureliano Wilson  Clinic Number: [id number]    Therapy Diagnosis:   Encounter Diagnosis   Name Primary?    Impaired functional mobility, balance, gait, and endurance Yes     Physician: Shakeel Walton III, MD    Visit Date: 5/1/2024    Physician Orders: PT Eval and Treat   Medical Diagnosis from Referral: Gait instability [R26.81], Muscle weakness (generalized) [M62.81]   Evaluation Date: 3/7/2024  Authorization Period Expiration: 12/31/2024  Plan of Care Expiration: 5/3/2024  Progress Note Due: last assessed 4/8/2024  Date of Surgery: N/A  Visit # / Visits authorized: 8/20 (+eval)  FOTO: Discharge FOTO completed     Precautions: Standard and Seizure     Time In: 4:05 PM   Time Out: 4:35 PM  Total Billable Time: 30 minutes (1TA, 1NMR)    PTA Visit #: 0/5     Subjective     Patient reports: Ready for discharge today. Planning to get a  post-discharge   He  was partially  compliant with home exercise program.  Response to previous treatment: no complaints  Functional change: see goals     Pain: 0/10  Location: NA      Objective      Objective Measures updated at progress report unless specified.     30-Second Sit to Stand: 11 reps without bilateral upper extremity    FOTO Activities-specific Balance Confidence (ABC) Scale: 77.5%    Six-Minute Walk Test: 1220 feet (no rest breaks)    Lower Extremity Strength (updated values in parentheses)    RLE LLE   Hip Flexion: 4/5 (5/5) 4/5 (4+/5)   Hip Extension:  3-/5 (4/5) 3-/5 (4-/5)   Hip Abduction: 4/5 (4+/5) 4+/5 (4+/5)   Knee Extension: 5/5 (5/5) 5/5 (5/5)   Knee Flexion: 4+/5 (4+/5) 4+/5 (4+/5)   Ankle Dorsiflexion: 4+/5 (5/5) 4+/5 (5/5)      Treatment     Aureliano received the treatments listed below:      therapeutic exercises to develop strength, endurance, ROM, and flexibility for 15 minutes including:  -- Manual Muscle Testing (see above)  -- Review of  ACSM/CDC recommendations for weekly exercise including examples of each exercise domain    therapeutic activities to improve functional performance for 15 minutes, including  -- Six-Minute Walk Test, 30-Second Sit to Stand, FOTO Survey (see above)    Patient Education and Home Exercises       Education provided:   - continue regular exercise post-discharge    Written Home Exercises Provided: yes. Exercises were reviewed and Aureliano was able to demonstrate them prior to the end of the session.  Aureliano demonstrated good  understanding of the education provided. See Electronic Medical Record under Patient Instructions for exercises provided during therapy sessions    Assessment     Aureliano has achieved majority of functional goals indicating improvements in cardiovascular endurance, bilateral lower extremity strength, and balance confidence. He is appropriate for discharge to community [facility] considering progress toward goals coupled with capacity for continued independent/safe exercise. He understands he can contact PT with any questions/concerns post-discharge.    Aureliano Is progressing well towards his goals.   Patient prognosis is Good.     Patient's spiritual, cultural and educational needs considered and pt agreeable to plan of care and goals.     Anticipated barriers to physical therapy: work commitments/schedule access     Goals:   Short Term Goals: 4 weeks   Patient will be compliant with HEP in order to maximize PT benefits Met  Patient will score >/= 40% on FOTO ABC survey in order to improve self-perception of functional mobility Met  Patient will score >/= 10 repetitions on 30-Second Sit to  order to improve bilateral lower extremity endurance and muscular power for transfers  Met  Patient will complete and walk >/= 800 feet on Six-Minute Walk Test in order to improve endurance for community mobility Met     Long Term Goals: 8 weeks   Patient will score >/= 60% on FOTO ABC survey in order to improve  self-perception of functional mobility  Met  Patient will improve bilateral lower extremity MMT grades by >/=1/2 grade in order to improve strength for ADL completion Partially met  Patient will score >/= 12 repetitions on 30-Second Sit to  order to improve bilateral lower extremity  endurance and muscular power for transfers Progressing not met  Patient will complete and walk >/= 1000 feet on Six-Minute Walk Test in order to improve endurance for community mobility Met  Patient will begin some form of home/community fitness in order to sustain progress gained in PT Met walking program and safe for community fitness at gym    Plan     Discharge PT    AMARA KIRKLAND, PT

## 2024-06-14 ENCOUNTER — OFFICE VISIT (OUTPATIENT)
Dept: URGENT CARE | Facility: CLINIC | Age: 64
End: 2024-06-14
Payer: COMMERCIAL

## 2024-06-14 VITALS
BODY MASS INDEX: 29.04 KG/M2 | TEMPERATURE: 98 F | OXYGEN SATURATION: 96 % | DIASTOLIC BLOOD PRESSURE: 96 MMHG | HEART RATE: 74 BPM | HEIGHT: 71 IN | SYSTOLIC BLOOD PRESSURE: 153 MMHG | RESPIRATION RATE: 17 BRPM | WEIGHT: 207.44 LBS

## 2024-06-14 DIAGNOSIS — L82.1 RAISED SEBORRHEIC KERATOSIS: Primary | ICD-10-CM

## 2024-06-14 DIAGNOSIS — T14.8XXA NONTRAUMATIC TEAR OF SKIN: ICD-10-CM

## 2024-06-14 PROCEDURE — 99213 OFFICE O/P EST LOW 20 MIN: CPT | Mod: S$GLB,,, | Performed by: FAMILY MEDICINE

## 2024-06-14 RX ORDER — MUPIROCIN 20 MG/G
OINTMENT TOPICAL 2 TIMES DAILY
Qty: 1 G | Refills: 0 | Status: SHIPPED | OUTPATIENT
Start: 2024-06-14 | End: 2024-06-17

## 2024-06-14 NOTE — PROGRESS NOTES
"Subjective:      Patient ID: Aureliano Wilson is a 64 y.o. male.    Vitals:  height is 5' 11" (1.803 m) and weight is 94.1 kg (207 lb 7.3 oz). His oral temperature is 98 °F (36.7 °C). His blood pressure is 153/96 (abnormal) and his pulse is 74. His respiration is 17 and oxygen saturation is 96%.     Chief Complaint: Laceration    Pt coming into clinic with laceration behind ear,this appeared 1 week ago, treatment includes nothing.    Laceration   Pain location: behind left ear. The pain is at a severity of 0/10. The patient is experiencing no pain. The pain has been Constant since onset. His tetanus status is unknown.     Skin:  Positive for laceration. Negative for erythema.      Objective:     Physical Exam   Constitutional: He is oriented to person, place, and time. He appears well-developed.   HENT:   Head: Normocephalic and atraumatic. Head is without abrasion, without contusion and without laceration.       Ears:   Right Ear: External ear normal.   Left Ear: External ear normal.   Nose: Nose normal.   Mouth/Throat: Oropharynx is clear and moist and mucous membranes are normal.   Eyes: Conjunctivae, EOM and lids are normal. Pupils are equal, round, and reactive to light.   Neck: Trachea normal and phonation normal. Neck supple.   Cardiovascular: Normal rate, regular rhythm and normal heart sounds.   Pulmonary/Chest: Effort normal and breath sounds normal. No stridor. No respiratory distress.   Musculoskeletal: Normal range of motion.         General: Normal range of motion.   Neurological: He is alert and oriented to person, place, and time.   Skin: Skin is warm, dry, intact and no rash. Capillary refill takes less than 2 seconds. No abrasion, No burn, No bruising, No erythema and No ecchymosis   Psychiatric: His speech is normal and behavior is normal. Judgment and thought content normal.   Nursing note and vitals reviewed.      Assessment:     1. Raised seborrheic keratosis    2. Nontraumatic tear of skin  "       Plan:       Raised seborrheic keratosis  -     mupirocin (BACTROBAN) 2 % ointment; Apply topically 2 (two) times daily. for 3 days  Dispense: 1 g; Refill: 0    Nontraumatic tear of skin  -     mupirocin (BACTROBAN) 2 % ointment; Apply topically 2 (two) times daily. for 3 days  Dispense: 1 g; Refill: 0      Pt advised to also to continue to monitor the keratosis and then follow up with PCP if it continues to be irritated.     Thank you for choosing [facility]!     Our goal in the Urgent Care is to always provide outstanding medical care. You may receive a survey by mail or e-mail in the next week regarding your experience today. We would greatly appreciate you completing and returning the survey. Your feedback provides us with a way to recognize our staff who provide very good care, and it helps us learn how to improve when your experience was below our aspiration of excellence.       We appreciate you trusting us with your medical care. We hope you feel better soon. We will be happy to take care of you for all of your future medical needs.  You must understand that you've received an Urgent Care treatment only and that you may be released before all your medical problems are known or treated. You, the patient, will arrange for follow up care as instructed.  Follow up with your PCP or specialty clinic as directed in the next 1-2 weeks if not improved or as needed.  You can call [phone] to schedule an appointment with the appropriate provider.  Another option is to follow up with [facility] ([email or web address]) virtually for quick simple medical advice.  If your condition worsens we recommend that you receive another evaluation at the emergency room immediately or contact your primary medical clinics after hours call service to discuss your concerns.  Please return here or go to the Emergency Department for any concerns or worsening of  condition.      *If you were prescribed a narcotic or controlled medication, do not drive or operate heavy equipment or machinery while taking these medications.

## 2024-06-18 ENCOUNTER — TELEPHONE (OUTPATIENT)
Dept: FAMILY MEDICINE | Facility: HOSPITAL | Age: 64
End: 2024-06-18
Payer: COMMERCIAL

## 2024-06-18 NOTE — TELEPHONE ENCOUNTER
Pt referred to the ED for today,  appt scheduled for 6/21 with Dr. Walton.   ----- Message from Mitzy Lemus sent at 6/18/2024 10:17 AM CDT -----  Type:  Requesting Apt     Who Called: Pt  Does the patient know what this is regarding?: would like an apt   Would the patient rather a call back or a response via [facility]? Call  Best Call Back Number: [phone]  Additional Information: pt is feels like a lot of fluid in abdomen that is not coming out, very uncomfortable

## 2024-06-21 ENCOUNTER — TELEPHONE (OUTPATIENT)
Dept: GASTROENTEROLOGY | Facility: CLINIC | Age: 64
End: 2024-06-21
Payer: COMMERCIAL

## 2024-06-21 ENCOUNTER — OFFICE VISIT (OUTPATIENT)
Dept: FAMILY MEDICINE | Facility: HOSPITAL | Age: 64
End: 2024-06-21
Attending: FAMILY MEDICINE
Payer: COMMERCIAL

## 2024-06-21 VITALS
WEIGHT: 207.25 LBS | BODY MASS INDEX: 29.02 KG/M2 | DIASTOLIC BLOOD PRESSURE: 72 MMHG | HEIGHT: 71 IN | HEART RATE: 61 BPM | OXYGEN SATURATION: 98 % | SYSTOLIC BLOOD PRESSURE: 112 MMHG

## 2024-06-21 DIAGNOSIS — R39.9 LOWER URINARY TRACT SYMPTOMS: ICD-10-CM

## 2024-06-21 DIAGNOSIS — Z12.11 COLON CANCER SCREENING: ICD-10-CM

## 2024-06-21 DIAGNOSIS — R15.9 INCONTINENCE OF FECES, UNSPECIFIED FECAL INCONTINENCE TYPE: ICD-10-CM

## 2024-06-21 DIAGNOSIS — H61.92 SKIN LESION OF LEFT EAR: ICD-10-CM

## 2024-06-21 DIAGNOSIS — R18.8 OTHER ASCITES: Primary | ICD-10-CM

## 2024-06-21 RX ORDER — ATORVASTATIN CALCIUM 40 MG/1
40 TABLET, FILM COATED ORAL
COMMUNITY
Start: 2024-04-21

## 2024-06-21 NOTE — TELEPHONE ENCOUNTER
Clinic appt scheduled with pt on Wednesday, July 17, 2024 at 915am for repeat colonoscopy.  Pt given clinic address and advised to check in at 1st floor kiosk.  Pt acknowledged understanding.

## 2024-07-08 ENCOUNTER — HOSPITAL ENCOUNTER (OUTPATIENT)
Dept: RADIOLOGY | Facility: HOSPITAL | Age: 64
Discharge: HOME | End: 2024-07-08
Attending: FAMILY MEDICINE
Payer: COMMERCIAL

## 2024-07-08 DIAGNOSIS — R18.8 OTHER ASCITES: ICD-10-CM

## 2024-07-08 PROCEDURE — 76700 US EXAM ABDOM COMPLETE: CPT | Mod: TC

## 2024-07-08 PROCEDURE — 76700 US EXAM ABDOM COMPLETE: CPT | Mod: 26,,, | Performed by: RADIOLOGY

## 2024-07-17 ENCOUNTER — OFFICE VISIT (OUTPATIENT)
Dept: GASTROENTEROLOGY | Facility: CLINIC | Age: 64
End: 2024-07-17
Payer: COMMERCIAL

## 2024-07-17 VITALS
HEART RATE: 74 BPM | HEIGHT: 71 IN | BODY MASS INDEX: 29.26 KG/M2 | DIASTOLIC BLOOD PRESSURE: 89 MMHG | WEIGHT: 209 LBS | SYSTOLIC BLOOD PRESSURE: 151 MMHG

## 2024-07-17 DIAGNOSIS — K83.9 BILE DUCT ABNORMALITY: Primary | ICD-10-CM

## 2024-07-17 DIAGNOSIS — Z12.11 COLON CANCER SCREENING: ICD-10-CM

## 2024-07-17 DIAGNOSIS — R15.9 INCONTINENCE OF FECES, UNSPECIFIED FECAL INCONTINENCE TYPE: ICD-10-CM

## 2024-07-17 PROCEDURE — 3079F DIAST BP 80-89 MM HG: CPT | Mod: CPTII,S$GLB,, | Performed by: INTERNAL MEDICINE

## 2024-07-17 PROCEDURE — 1101F PT FALLS ASSESS-DOCD LE1/YR: CPT | Mod: CPTII,S$GLB,, | Performed by: INTERNAL MEDICINE

## 2024-07-17 PROCEDURE — 3044F HG A1C LEVEL LT 7.0%: CPT | Mod: CPTII,S$GLB,, | Performed by: INTERNAL MEDICINE

## 2024-07-17 PROCEDURE — 3008F BODY MASS INDEX DOCD: CPT | Mod: CPTII,S$GLB,, | Performed by: INTERNAL MEDICINE

## 2024-07-17 PROCEDURE — 1159F MED LIST DOCD IN RCRD: CPT | Mod: CPTII,S$GLB,, | Performed by: INTERNAL MEDICINE

## 2024-07-17 PROCEDURE — 3288F FALL RISK ASSESSMENT DOCD: CPT | Mod: CPTII,S$GLB,, | Performed by: INTERNAL MEDICINE

## 2024-07-17 PROCEDURE — 3077F SYST BP >= 140 MM HG: CPT | Mod: CPTII,S$GLB,, | Performed by: INTERNAL MEDICINE

## 2024-07-17 PROCEDURE — 99203 OFFICE O/P NEW LOW 30 MIN: CPT | Mod: S$GLB,,, | Performed by: INTERNAL MEDICINE

## 2024-07-17 PROCEDURE — 99999 PR PBB SHADOW E&M-EST. PATIENT-LVL IV: CPT | Mod: PBBFAC,,, | Performed by: INTERNAL MEDICINE

## 2024-07-17 RX ORDER — SODIUM, POTASSIUM,MAG SULFATES 17.5-3.13G
1 SOLUTION, RECONSTITUTED, ORAL ORAL DAILY
Qty: 1 KIT | Refills: 0 | Status: SHIPPED | OUTPATIENT
Start: 2024-07-17 | End: 2024-07-19

## 2024-07-17 NOTE — PROGRESS NOTES
"[facility]      HPI 64 y.o. male who presents due to history of abnormal imaging of the biliary tree and for history of colon polyps. He reports overall doing well since his last colonoscopy. He reports no abdominal pain, nausea, emesis, diarrhea, or constipation. Does endorse to blood upon wiping once when changing to a different textured toilet paper but this resolved after cessation of that product. He denies any use of blood thinners. No family history of colon cancer.     Past Medical History  Erectile Dysfunction  Seizures    Physical Examination  BP (!) 151/89 (BP Location: Left arm, Patient Position: Sitting, BP Method: Medium (Automatic))   Pulse 74   Ht 5' 11" (1.803 m)   Wt 94.8 kg (208 lb 15.9 oz)   BMI 29.15 kg/m²   General appearance: alert, cooperative, no distress  Abdomen: soft, non-tender; bowel sounds normoactive; no organomegaly    Labs  Hb = 14.9  T bili = 0.5 I AST = 18 I ALT = 13 I Alk phos = 60    Imaging  Abdominal Ultrasound: simple hepatic cysts, CBD = 9.7 mm (images reviewed)   Assessment:   Mr. Aureliano Wilson is a 64 year old male with:  History of colon polyps due for surveillance colonoscopy. Last exam with me in 2017 with four polyps removed including one lesion over 10mm in size   Dilated common bile duct without abdominal pain or elevated bilirubin or AST/ALT - this is likely benign in nature (insignificant) as the normal range for CBD is 6-10mm in patients over 60 years of age     Plan:  - Plan for colonoscopy on 8/5/2024. If normal or only 1-2 small polyps, plan repeat exam in 5 years   - Suprep sent to [email or web address]  - Patient known to our service from previous exam in 2017 but reinterview next visit     rPiya Parish MD   [facility]     Tung Cochran MD   [facility]    [address], [address]  [place] [address] [phone]    "

## 2024-07-17 NOTE — PATIENT INSTRUCTIONS
SUPREP Instructions     [facility] [address]  Clinic Office [phone]  Endoscopy Lab [phone]     You are scheduled for a Colonoscopy with Dr. Cochran  on Thursday, August 5, 2024   at [facility] in [place].    Check in at the Hospital -1st floor, Information desk.   Call [phone] to reschedule.     An adult friend/family member must come with you to drive you home.  You cannot drive, take a taxi, Uber/Lyft or bus to leave the Endoscopy Center alone.  If you do not have someone to drive you home, your test will be cancelled.      Please follow the directions of your doctor if you take any pills that thin your blood. If you take these meds: Aggrenox, Brilinta, Effient, Eliquis, Lovenox, Plavix, Pletal, Pradaxa, Ticilid, Xarelto or Coumadin, let the doctor's office know.     Please hold any GLP-1 medications prior to the procedure: Dulaglutide Trulicity(hold week prior), Exenatide Byetta (hold the morning of procedure), Semaglutide Ozempic (hold week prior), Liraglutide Victoza, Saxenda(hold week prior), Lixisenatide Adlyxin (hold the morning of procedure), Semaglutide Rybelsus (hold the morning of procedure), Tirzepatide Mounjaro (hold week prior)      DON'T: On the morning of the test do not take insulin or pills for diabetes.      DO: On the morning of the test, do take any pills for blood pressure, heart, anti-rejection and or seizures with a small sip of water. Bring any inhalers with you.     To have a good prep, you must follow these instructions - please do not use the directions from the pharmacy.     The doctor will send a prescription for the SUPREP.        The Day Before the test:     You can only drink CLEAR LIQUIDS the whole day before your test.  You can't eat any food for the whole day.     You CAN have:  Water, Coffee or decaf coffee (no milk or cream)  Tea  Soft drinks - regular and sugar free  Jello (green or yellow)  Apple Juice, white  grape juice, white cranberry juice  Gatorade, Power Aid, Crystal Light, Ramone Aid  Lemonade and Limeade  Bouillon, clear soup  Snowball, popsicles  YOU CAN'T DRINK ANYTHING RED, PURPLE ORANGE OR BLUE   YOU CAN'T DRINK ALCOHOL  ONLY DRINK WHAT IS ON THE LIST        At 5 pm the night before your test:     Pour the 1st bottle of SUPREP into the cup provided in the box. Add water to the line on the cup and mix well.  Drink the whole cup and then drink 2 more full cups of water over 1 hour.  This can be easier to drink if it is cold. You can mix it 20 minutes ahead of time and place in the refrigerator before you drink it.  You must drink it within 30-45 minutes of mixing it.  Do NOT pour the drink over ice.  You can drink it with a straw.     The Day of the test - We will call you 2 days before your test to tell you what time to get there.     5 hours before you come to the hospital (this may be in the middle of the night)  Pour the 2nd bottle of SUPREP into the cup provided in the box. Add water to the line on the cup and mix well.  Drink the whole cup and then drink 2 more full cups of water over 1 hour.  It might be easier to drink if it is cold. You can mix it 20 minutes ahead of time and place in the refrigerator before you drink it.  You must drink it within 30-45 minutes of mixing it.  Do NOT pour the drink over ice.  You can drink it with a straw.     YOU CAN'T EAT OR DRINK ANYTHING ELSE ONCE YOU FINISH THE PREP     Leave all valuables and jewelry at home. You will be at the hospital for 2-4 hours.     Call the Endoscopy department at [phone] with any questions about your procedure.

## 2024-08-01 ENCOUNTER — TELEPHONE (OUTPATIENT)
Dept: ENDOSCOPY | Facility: HOSPITAL | Age: 64
End: 2024-08-01
Payer: COMMERCIAL

## 2024-08-01 NOTE — TELEPHONE ENCOUNTER
Left message instructing patient to call dept @ [phone] between 8am-4pm.    Arrival time to be given @ *820  (Message sent via [facility])

## 2024-08-02 ENCOUNTER — TELEPHONE (OUTPATIENT)
Dept: ENDOSCOPY | Facility: HOSPITAL | Age: 64
End: 2024-08-02
Payer: COMMERCIAL

## 2024-08-02 ENCOUNTER — TELEPHONE (OUTPATIENT)
Dept: GASTROENTEROLOGY | Facility: CLINIC | Age: 64
End: 2024-08-02
Payer: COMMERCIAL

## 2024-08-02 NOTE — TELEPHONE ENCOUNTER
Pt received message with arrival time, having issues with transportation.  Pt given Endoscopy number to confirm, [phone].

## 2024-08-02 NOTE — TELEPHONE ENCOUNTER
----- Message from Mitzy Lemus sent at 8/2/2024 10:50 AM CDT -----  Type:  Procedure Questions     Who Called:Pt   Does the patient know what this is regarding?: pt needs to discuss procedure details   Would the patient rather a call back or a response via [facility]? Call   Best Call Back Number:[phone]   Additional Information:

## 2024-08-02 NOTE — TELEPHONE ENCOUNTER
Spoke with patient about arrival time @. 730  Covid test =     Prep instructions reviewed: the day before the procedure, follow a clear liquid diet all day, then start the first 1/2 of prep at 5pm and take 2nd 1/2 of prep @.230  Pt must be completely NPO when prep completed @.   430           Medications: Do not take Insulin or oral diabetic medications the day of the procedure.  Take as prescribed: heart, seizure and blood pressure medication in the morning with a sip of water (less than an ounce).  Take any breathing medications and bring inhalers to hospital with you Leave all valuables and jewelry at home.     Wear comfortable clothes to procedure to change into hospital gown You cannot drive for 24 hours after your procedure because you will receive sedation for your procedure to make you comfortable.  A ride must be provided at discharge.

## 2024-08-04 ENCOUNTER — ANESTHESIA EVENT (OUTPATIENT)
Dept: ENDOSCOPY | Facility: HOSPITAL | Age: 64
End: 2024-08-04
Payer: COMMERCIAL

## 2024-08-05 ENCOUNTER — ANESTHESIA (OUTPATIENT)
Dept: ENDOSCOPY | Facility: HOSPITAL | Age: 64
End: 2024-08-05
Payer: COMMERCIAL

## 2024-08-05 ENCOUNTER — HOSPITAL ENCOUNTER (OUTPATIENT)
Facility: HOSPITAL | Age: 64
Discharge: HOME | End: 2024-08-05
Attending: INTERNAL MEDICINE | Admitting: INTERNAL MEDICINE
Payer: COMMERCIAL

## 2024-08-05 VITALS
HEIGHT: 71 IN | DIASTOLIC BLOOD PRESSURE: 72 MMHG | HEART RATE: 88 BPM | SYSTOLIC BLOOD PRESSURE: 127 MMHG | OXYGEN SATURATION: 98 % | RESPIRATION RATE: 20 BRPM | BODY MASS INDEX: 29.12 KG/M2 | TEMPERATURE: 98 F | WEIGHT: 208 LBS

## 2024-08-05 DIAGNOSIS — K63.5 POLYP OF COLON, UNSPECIFIED PART OF COLON, UNSPECIFIED TYPE: Primary | ICD-10-CM

## 2024-08-05 DIAGNOSIS — Z86.010 HISTORY OF COLON POLYPS: ICD-10-CM

## 2024-08-05 PROCEDURE — 25000003 PHARM REV CODE 250: Performed by: INTERNAL MEDICINE

## 2024-08-05 PROCEDURE — 37000008 HC ANESTHESIA 1ST 15 MINUTES: Performed by: INTERNAL MEDICINE

## 2024-08-05 PROCEDURE — 45385 COLONOSCOPY W/LESION REMOVAL: CPT | Mod: PT | Performed by: INTERNAL MEDICINE

## 2024-08-05 PROCEDURE — 63600175 PHARM REV CODE 636 W HCPCS: Performed by: NURSE ANESTHETIST, CERTIFIED REGISTERED

## 2024-08-05 PROCEDURE — 27201089 HC SNARE, DISP (ANY): Performed by: INTERNAL MEDICINE

## 2024-08-05 PROCEDURE — 88305 TISSUE EXAM BY PATHOLOGIST: CPT | Mod: 26,,, | Performed by: PATHOLOGY

## 2024-08-05 PROCEDURE — 25000003 PHARM REV CODE 250: Performed by: NURSE ANESTHETIST, CERTIFIED REGISTERED

## 2024-08-05 PROCEDURE — 88305 TISSUE EXAM BY PATHOLOGIST: CPT | Performed by: PATHOLOGY

## 2024-08-05 PROCEDURE — D9220A PRA ANESTHESIA: Mod: ANES,,, | Performed by: STUDENT IN AN ORGANIZED HEALTH CARE EDUCATION/TRAINING PROGRAM

## 2024-08-05 PROCEDURE — 37000009 HC ANESTHESIA EA ADD 15 MINS: Performed by: INTERNAL MEDICINE

## 2024-08-05 PROCEDURE — D9220A PRA ANESTHESIA: Mod: CRNA,,, | Performed by: NURSE ANESTHETIST, CERTIFIED REGISTERED

## 2024-08-05 RX ORDER — PROPOFOL 10 MG/ML
VIAL (ML) INTRAVENOUS CONTINUOUS PRN
Status: DISCONTINUED | OUTPATIENT
Start: 2024-08-05 | End: 2024-08-05

## 2024-08-05 RX ORDER — PHENYLEPHRINE HYDROCHLORIDE 10 MG/ML
INJECTION INTRAVENOUS
Status: DISCONTINUED | OUTPATIENT
Start: 2024-08-05 | End: 2024-08-05

## 2024-08-05 RX ORDER — SODIUM CHLORIDE 0.9 % (FLUSH) 0.9 %
10 SYRINGE (ML) INJECTION
Status: DISCONTINUED | OUTPATIENT
Start: 2024-08-05 | End: 2024-08-05 | Stop reason: HOSPADM

## 2024-08-05 RX ORDER — PROPOFOL 10 MG/ML
VIAL (ML) INTRAVENOUS
Status: DISCONTINUED | OUTPATIENT
Start: 2024-08-05 | End: 2024-08-05

## 2024-08-05 RX ORDER — LIDOCAINE HYDROCHLORIDE 20 MG/ML
INJECTION INTRAVENOUS
Status: DISCONTINUED | OUTPATIENT
Start: 2024-08-05 | End: 2024-08-05

## 2024-08-05 RX ORDER — SODIUM CHLORIDE 9 MG/ML
INJECTION, SOLUTION INTRAVENOUS CONTINUOUS
Status: DISCONTINUED | OUTPATIENT
Start: 2024-08-05 | End: 2024-08-05 | Stop reason: HOSPADM

## 2024-08-05 RX ADMIN — PHENYLEPHRINE HYDROCHLORIDE 100 MCG: 10 INJECTION INTRAVENOUS at 10:08

## 2024-08-05 RX ADMIN — PROPOFOL 150 MCG/KG/MIN: 10 INJECTION, EMULSION INTRAVENOUS at 09:08

## 2024-08-05 RX ADMIN — PROPOFOL 90 MG: 10 INJECTION, EMULSION INTRAVENOUS at 09:08

## 2024-08-05 RX ADMIN — SODIUM CHLORIDE: 9 INJECTION, SOLUTION INTRAVENOUS at 08:08

## 2024-08-05 RX ADMIN — LIDOCAINE HYDROCHLORIDE 100 MG: 20 INJECTION, SOLUTION INTRAVENOUS at 09:08

## 2024-08-05 RX ADMIN — PHENYLEPHRINE HYDROCHLORIDE 100 MCG: 10 INJECTION INTRAVENOUS at 09:08

## 2024-08-05 NOTE — H&P
[facility]      HPI 64 y.o. male who presents due to history of abnormal imaging of the biliary tree and for history of colon polyps. He reports overall doing well since his last colonoscopy. He reports no abdominal pain, nausea, emesis, diarrhea, or constipation. Does endorse to blood upon wiping once when changing to a different textured toilet paper but this resolved after cessation of that product. He denies any use of blood thinners. No family history of colon cancer.     Physical Examination  There were no vitals taken for this visit.  General appearance: alert, cooperative, no distress  Abdomen: soft, non-tender; bowel sounds normoactive; no organomegaly    Assessment:   Mr. Aureliano Wilson is a 64 year old male with:  History of colon polyps due for surveillance colonoscopy. Last exam with me in 2017 with four polyps removed including one lesion over 10mm in size     Plan:  - Colonoscopy today. If normal or only 1-2 small polyps, plan repeat exam in 5 years     Tung Cochran MD   [facility]

## 2024-08-05 NOTE — PROVATION PATIENT INSTRUCTIONS
Discharge Summary/Instructions after an Endoscopic Procedure  Patient Name: Aureliano Wilson  Patient MRN: [id number]  Patient YOB: 1960  Monday, August 5, 2024  Tung Cochran MD  Dear patient,  As a result of recent federal legislation (The Federal Cures Act), you may   receive lab or pathology results from your procedure in your [facility]   account before your physician is able to contact you. Your physician or   their representative will relay the results to you with their   recommendations at their soonest availability.  Thank you,  Your health is very important to us during the Covid Crisis. Following your   procedure today, you will receive a daily text for 2 weeks asking about   signs or symptoms of Covid 19.  Please respond to this text when you   receive it so we can follow up and keep you as safe as possible.   RESTRICTIONS:  During your procedure today, you received medications for sedation.  These   medications may affect your judgment, balance and coordination.  Therefore,   for 24 hours, you have the following restrictions:   - DO NOT drive a car, operate machinery, make legal/financial decisions,   sign important papers or drink alcohol.    ACTIVITY:  Today: no heavy lifting, straining or running due to procedural   sedation/anesthesia.  The following day: return to full activity including work.  DIET:  Eat and drink normally unless instructed otherwise.     TREATMENT FOR COMMON SIDE EFFECTS:  - Mild abdominal pain, nausea, belching, bloating or excessive gas:  rest,   eat lightly and use a heating pad.  - Sore Throat: treat with throat lozenges and/or gargle with warm salt   water.  - Because air was used during the procedure, expelling large amounts of air   from your rectum or belching is normal.  - If a bowel prep was taken, you may not have a bowel movement for 1-3 days.    This is normal.  SYMPTOMS TO WATCH FOR AND REPORT TO YOUR PHYSICIAN:  1. Abdominal pain or bloating, other than gas  cramps.  2. Chest pain.  3. Back pain.  4. Signs of infection such as: chills or fever occurring within 24 hours   after the procedure.  5. Rectal bleeding, which would show as bright red, maroon, or black stools.   (A tablespoon of blood from the rectum is not serious, especially if   hemorrhoids are present.)  6. Vomiting.  7. Weakness or dizziness.  GO DIRECTLY TO THE NEAREST EMERGENCY ROOM IF YOU HAVE ANY OF THE FOLLOWING:      Difficulty breathing              Chills and/or fever over 101 F   Persistent vomiting and/or vomiting blood   Severe abdominal pain   Severe chest pain   Black, tarry stools   Bleeding- more than one tablespoon   Any other symptom or condition that you feel may need urgent attention  Your doctor recommends these additional instructions:  If any biopsies were taken, your doctors clinic will contact you in 1 to 2   weeks with any results.  - Repeat colonoscopy in 5 years for surveillance.   - Discharge to home  - Resume previous diet and medications  - Condition stable   - The signs and symptoms of potential delayed complications were discussed   with the patient. If signs or symptoms of these complications develop, call   the [facility] at [phone].   - Return to normal activities tomorrow.  Written discharge instructions were   provided to the patient.   - Patient has a contact number available for emergencies.  The signs and   symptoms of potential delayed complications were discussed with the   patient.  Return to normal activities tomorrow.  Written discharge   instructions were provided to the patient.  For questions, problems or results please call your physician - Tung Cochran MD.  EMERGENCY PHONE NUMBER: [phone],  LAB RESULTS: [phone]  IF A COMPLICATION OR EMERGENCY SITUATION ARISES AND YOU ARE UNABLE TO REACH   YOUR PHYSICIAN - GO DIRECTLY TO THE EMERGENCY ROOM.  MD Tung Zuniga MD  8/5/2024 10:16:14 AM  This report has been  verified and signed electronically.  Dear patient,  As a result of recent federal legislation (The Federal Cures Act), you may   receive lab or pathology results from your procedure in your [facility]   account before your physician is able to contact you. Your physician or   their representative will relay the results to you with their   recommendations at their soonest availability.  Thank you,  PROVATION

## 2024-08-06 LAB
FINAL PATHOLOGIC DIAGNOSIS: NORMAL
GROSS: NORMAL
Lab: NORMAL

## 2024-08-08 ENCOUNTER — TELEPHONE (OUTPATIENT)
Dept: GASTROENTEROLOGY | Facility: HOSPITAL | Age: 64
End: 2024-08-08
Payer: COMMERCIAL

## 2024-08-10 PROBLEM — Z86.010 HISTORY OF COLON POLYPS: Status: ACTIVE | Noted: 2024-08-10

## 2024-08-10 PROBLEM — Z86.0100 HISTORY OF COLON POLYPS: Status: ACTIVE | Noted: 2024-08-10

## 2024-10-23 ENCOUNTER — PATIENT MESSAGE (OUTPATIENT)
Dept: RESEARCH | Facility: HOSPITAL | Age: 64
End: 2024-10-23
Payer: COMMERCIAL

## 2024-10-24 ENCOUNTER — PATIENT MESSAGE (OUTPATIENT)
Dept: RESEARCH | Facility: HOSPITAL | Age: 64
End: 2024-10-24
Payer: COMMERCIAL

## 2024-12-17 ENCOUNTER — TELEPHONE (OUTPATIENT)
Dept: NEUROLOGY | Facility: CLINIC | Age: 64
End: 2024-12-17
Payer: COMMERCIAL

## 2024-12-19 NOTE — PROGRESS NOTES
[facility]  Epilepsy Clinic Progress Note      [facility] - NEUROLOGY 7TH FL  [facility], [place]    Date: 12/20/24  Patient Name: Aureliano Wilson   MRN: [id number]   PCP: Shakeel Walton III  Referring Provider: No ref. provider found    Assessment:     Epilepsy: check levels, continue  mg BID.  Fatigue: possibly deconditioning, check basic labs, TSH, b12.  Advised patient to follow up with PCP.  He is not weak on examination so what he really is describing is fatigue.    RTC: 6 months.    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 I recommended seizure precautions with regards to avoiding unsupervised water recreational activity or bathing in tubs, climbing or working at heights, operation of heavy or dangerous machinery, caution around fire and sources of high heat, as well as any other activity which could put a patient at danger in case of a seizure.  I also reviewed the [place] DMV law and recommended that the patient not drive for 6 months in the event of breakthrough seizures    Luiza Bergeron MD  [facility]   Department of Neurology    Patient note was created using MModal Dictation.  Any errors in syntax or even information may not have been identified and edited on initial review prior to signing this note.  Subjective:          Mr. Aureliano Wilson is a 64 y.o. male returns to clinic for continued management of epilepsy, focal.     Interval history 12/20/24:  He has  not had any further seizures since last visit.    Sleep - fine some nights, other nights waking up a lot to go to the bathroom.  He does snore.  He wakes up feeling tired.  Has not been screened for sleep apnea.  Endorses gaining 20 lbs recently.  Does not regularly exercise.       Interval history:  Plan at time of last clinic visit was increase Keppra to 750 mg twice a day.  He is tolerating ok ,does note he is more tired on the higher dose.     Starts w/ nauseous feeling, leg will stiffen up and LUE.  Since the last visit, patient reports seizures have decreased significantly.    He has been doing well.  Last seizure in august 2023.      HPI  Per Dr Vela:  Patient reports sound July 10, 2023, he experienced his 1st episode of sudden onset tonic posturing of the left upper extremity with flexion at the elbow and wrist toward the chest and tonic extension of the left lower extremity causing him to be unable to move the limbs for an estimated 1-2 minutes.  Over this time frame, muscles were engaged to the point of pain but no rhythmic movements or jerking.  When the event ended, limbs gradually but quickly relaxed and he was able to once again.  Very sore muscles afterwards.  Also felt very tired.  No loss of consciousness or other symptoms involved.  No bowel/bladder incontinence or tongue biting.  After seeking emergency evaluation in the urgent care setting, Neurology was reportedly consulted by phone by the facility.  Advised that they begin Keppra and initiate neurology referral for further case definition.    Patient has been taking Keppra 500 mg twice daily with no noted side effects outside of some mild fatigue.  He continued to have symptoms over the following days and highlights July 20th as having more than 3 independent episodes over the course of the day.  He is had no further episodes of this type since August 18, 2023.       Presents today for further evaluation.  MRI epilepsy protocol with/without  contrast has been completed with no significant/contributory findings.  Just small-vessel ischemia.  No evidence of large strokes in the past.     No personal history is seizures prior to this event.  No history of head trauma or neurological infection.  No history of recent illness before onset of symptoms.  No similar episodes in the past.     No EEG completed at this time.  Patient is not driving as a precaution..     Prior Studies:  9/20/23: normal EEG       PAST MEDICAL HISTORY:  Past Medical History:   Diagnosis Date    Epilepsy, unspecified, not intractable, with status epilepticus     Erectile dysfunction        PAST SURGICAL HISTORY:  Past Surgical History:   Procedure Laterality Date    COLONOSCOPY N/A 6/5/2017    Procedure: COLONOSCOPY;  Surgeon: Tung Cochran MD;  Location: [facility] ENDO;  Service: Endoscopy;  Laterality: N/A;    COLONOSCOPY N/A 8/5/2024    Procedure: COLONOSCOPY;  Surgeon: Tung Cochran MD;  Location: [facility] ENDO;  Service: Endoscopy;  Laterality: N/A;       CURRENT MEDS:  Current Outpatient Medications   Medication Sig Dispense Refill    atorvastatin (LIPITOR) 40 MG tablet Take 40 mg by mouth. (Patient not taking: Reported on 6/21/2024)      levETIRAcetam (KEPPRA) 750 MG Tab Take 1 tablet (750 mg total) by mouth 2 (two) times daily. 180 tablet 3     No current facility-administered medications for this visit.       ALLERGIES:  Review of patient's allergies indicates:  No Known Allergies    FAMILY HISTORY:  Family History   Problem Relation Name Age of Onset    Diabetes Father      Stroke Father      Diabetes Mother         SOCIAL HISTORY:  Social History     Tobacco Use    Smoking status: Every Day     Current packs/day: 0.25     Average packs/day: 0.3 packs/day for 30.0 years (7.5 ttl pk-yrs)     Types: Cigarettes    Smokeless tobacco: Current    Tobacco comments:     6 cigarettes a day   Substance Use Topics    Alcohol use: Not Currently     Alcohol/week: 2.0 standard drinks of  "alcohol     Types: 2 Glasses of wine per week     Comment: socially    Drug use: No       Review of Systems:  12 system review of systems is negative except for the symptoms mentioned in HPI.      Objective:     Vitals:    12/20/24 1452   BP: (!) 139/90   Patient Position: Sitting   Pulse: 80   Weight: 95.7 kg (210 lb 15.7 oz)   Height: 5' 11" (1.803 m)     General: NAD, well nourished   Eyes: no tearing, discharge, no erythema   ENT: moist mucous membranes of the oral cavity, nares patent    Neck: Supple, full range of motion  Cardiovascular: Warm and well perfused, pulses equal and symmetrical  Lungs: Normal work of breathing, normal chest wall excursions  Skin: No rash, lesions, or breakdown on exposed skin  Psychiatry: Mood and affect are appropriate   Abdomen: soft, non tender, non distended  Extremeties: No cyanosis, clubbing or edema.    Neurological   MENTAL STATUS: Alert and oriented to person, place, and time. Attention and concentration within normal limits. Speech without dysarthria, able to name and repeat without difficulty. Recent and remote memory within normal limits   CRANIAL NERVES: Visual fields intact. PERRL. EOMI. Facial sensation intact. Face symmetrical. Hearing grossly intact. Full shoulder shrug bilaterally. Tongue protrudes midline   SENSORY: Sensation is intact to light touch throughout.  Joint position perception intact. Negative Romberg.   MOTOR: Normal bulk and tone. No pronator drift.  5/5 deltoid, biceps, triceps, interosseous, hand  bilaterally. 5/5 iliopsoas, knee extension/flexion, foot dorsi/plantarflexion bilaterally.    REFLEXES: Symmetric and 2+ throughout. Toes down going bilaterally.   CEREBELLAR/COORDINATION/GAIT: Gait slow and slightly wide based      " 5-Fu Counseling: 5-Fluorouracil Counseling:  I discussed with the patient the risks of 5-fluorouracil including but not limited to erythema, scaling, itching, weeping, crusting, and pain.

## 2024-12-20 ENCOUNTER — LAB VISIT (OUTPATIENT)
Dept: LAB | Facility: HOSPITAL | Age: 64
End: 2024-12-20
Attending: STUDENT IN AN ORGANIZED HEALTH CARE EDUCATION/TRAINING PROGRAM
Payer: COMMERCIAL

## 2024-12-20 ENCOUNTER — OFFICE VISIT (OUTPATIENT)
Dept: NEUROLOGY | Facility: CLINIC | Age: 64
End: 2024-12-20
Payer: COMMERCIAL

## 2024-12-20 VITALS
WEIGHT: 211 LBS | BODY MASS INDEX: 29.54 KG/M2 | DIASTOLIC BLOOD PRESSURE: 90 MMHG | HEART RATE: 80 BPM | SYSTOLIC BLOOD PRESSURE: 139 MMHG | HEIGHT: 71 IN

## 2024-12-20 DIAGNOSIS — G40.109 FOCAL EPILEPSY WITHOUT IMPAIRMENT OF CONSCIOUSNESS: ICD-10-CM

## 2024-12-20 DIAGNOSIS — Z74.09 IMPAIRED FUNCTIONAL MOBILITY, BALANCE, GAIT, AND ENDURANCE: Primary | ICD-10-CM

## 2024-12-20 DIAGNOSIS — R53.83 FATIGUE, UNSPECIFIED TYPE: ICD-10-CM

## 2024-12-20 LAB
ALBUMIN SERPL BCP-MCNC: 3.6 G/DL (ref 3.5–5.2)
ALP SERPL-CCNC: 78 U/L (ref 40–150)
ALT SERPL W/O P-5'-P-CCNC: 10 U/L (ref 10–44)
ANION GAP SERPL CALC-SCNC: 8 MMOL/L (ref 8–16)
AST SERPL-CCNC: 14 U/L (ref 10–40)
BASOPHILS # BLD AUTO: 0.05 K/UL (ref 0–0.2)
BASOPHILS NFR BLD: 0.8 % (ref 0–1.9)
BILIRUB SERPL-MCNC: 0.4 MG/DL (ref 0.1–1)
BUN SERPL-MCNC: 16 MG/DL (ref 8–23)
CALCIUM SERPL-MCNC: 9.4 MG/DL (ref 8.7–10.5)
CHLORIDE SERPL-SCNC: 107 MMOL/L (ref 95–110)
CO2 SERPL-SCNC: 26 MMOL/L (ref 23–29)
CREAT SERPL-MCNC: 0.9 MG/DL (ref 0.5–1.4)
DIFFERENTIAL METHOD BLD: ABNORMAL
EOSINOPHIL # BLD AUTO: 0.5 K/UL (ref 0–0.5)
EOSINOPHIL NFR BLD: 8.7 % (ref 0–8)
ERYTHROCYTE [DISTWIDTH] IN BLOOD BY AUTOMATED COUNT: 12.5 % (ref 11.5–14.5)
EST. GFR  (NO RACE VARIABLE): >60 ML/MIN/1.73 M^2
GLUCOSE SERPL-MCNC: 86 MG/DL (ref 70–110)
HCT VFR BLD AUTO: 46.5 % (ref 40–54)
HGB BLD-MCNC: 14.9 G/DL (ref 14–18)
IMM GRANULOCYTES # BLD AUTO: 0.02 K/UL (ref 0–0.04)
IMM GRANULOCYTES NFR BLD AUTO: 0.3 % (ref 0–0.5)
LYMPHOCYTES # BLD AUTO: 0.9 K/UL (ref 1–4.8)
LYMPHOCYTES NFR BLD: 15.6 % (ref 18–48)
MCH RBC QN AUTO: 28.2 PG (ref 27–31)
MCHC RBC AUTO-ENTMCNC: 32 G/DL (ref 32–36)
MCV RBC AUTO: 88 FL (ref 82–98)
MONOCYTES # BLD AUTO: 0.7 K/UL (ref 0.3–1)
MONOCYTES NFR BLD: 11.4 % (ref 4–15)
NEUTROPHILS # BLD AUTO: 3.8 K/UL (ref 1.8–7.7)
NEUTROPHILS NFR BLD: 63.2 % (ref 38–73)
NRBC BLD-RTO: 0 /100 WBC
PLATELET # BLD AUTO: 301 K/UL (ref 150–450)
PMV BLD AUTO: 10.1 FL (ref 9.2–12.9)
POTASSIUM SERPL-SCNC: 4.1 MMOL/L (ref 3.5–5.1)
PROT SERPL-MCNC: 7.3 G/DL (ref 6–8.4)
RBC # BLD AUTO: 5.28 M/UL (ref 4.6–6.2)
SODIUM SERPL-SCNC: 141 MMOL/L (ref 136–145)
TSH SERPL DL<=0.005 MIU/L-ACNC: 1.86 UIU/ML (ref 0.4–4)
VIT B12 SERPL-MCNC: 281 PG/ML (ref 210–950)
WBC # BLD AUTO: 5.97 K/UL (ref 3.9–12.7)

## 2024-12-20 PROCEDURE — 99999 PR PBB SHADOW E&M-EST. PATIENT-LVL III: CPT | Mod: PBBFAC,,, | Performed by: STUDENT IN AN ORGANIZED HEALTH CARE EDUCATION/TRAINING PROGRAM

## 2024-12-20 PROCEDURE — 84443 ASSAY THYROID STIM HORMONE: CPT | Performed by: STUDENT IN AN ORGANIZED HEALTH CARE EDUCATION/TRAINING PROGRAM

## 2024-12-20 PROCEDURE — 36415 COLL VENOUS BLD VENIPUNCTURE: CPT | Performed by: STUDENT IN AN ORGANIZED HEALTH CARE EDUCATION/TRAINING PROGRAM

## 2024-12-20 PROCEDURE — 82607 VITAMIN B-12: CPT | Performed by: STUDENT IN AN ORGANIZED HEALTH CARE EDUCATION/TRAINING PROGRAM

## 2024-12-20 PROCEDURE — 80053 COMPREHEN METABOLIC PANEL: CPT | Performed by: STUDENT IN AN ORGANIZED HEALTH CARE EDUCATION/TRAINING PROGRAM

## 2024-12-20 PROCEDURE — 85025 COMPLETE CBC W/AUTO DIFF WBC: CPT | Performed by: STUDENT IN AN ORGANIZED HEALTH CARE EDUCATION/TRAINING PROGRAM

## 2024-12-20 PROCEDURE — 80177 DRUG SCRN QUAN LEVETIRACETAM: CPT | Performed by: STUDENT IN AN ORGANIZED HEALTH CARE EDUCATION/TRAINING PROGRAM

## 2024-12-20 RX ORDER — LEVETIRACETAM 750 MG/1
750 TABLET ORAL 2 TIMES DAILY
Qty: 180 TABLET | Refills: 3 | Status: SHIPPED | OUTPATIENT
Start: 2024-12-20 | End: 2025-12-20

## 2024-12-23 LAB — LEVETIRACETAM SERPL-MCNC: 13.7 UG/ML (ref 3–60)

## 2025-03-31 ENCOUNTER — OFFICE VISIT (OUTPATIENT)
Dept: FAMILY MEDICINE | Facility: HOSPITAL | Age: 65
End: 2025-03-31
Attending: FAMILY MEDICINE
Payer: COMMERCIAL

## 2025-03-31 VITALS
HEIGHT: 71 IN | HEART RATE: 93 BPM | BODY MASS INDEX: 29.87 KG/M2 | WEIGHT: 213.38 LBS | SYSTOLIC BLOOD PRESSURE: 125 MMHG | DIASTOLIC BLOOD PRESSURE: 95 MMHG

## 2025-03-31 DIAGNOSIS — R97.20 PSA ELEVATION: ICD-10-CM

## 2025-03-31 DIAGNOSIS — R53.83 FATIGUE, UNSPECIFIED TYPE: ICD-10-CM

## 2025-03-31 DIAGNOSIS — R45.4 IRRITABILITY: ICD-10-CM

## 2025-03-31 DIAGNOSIS — R29.818 SUSPECTED SLEEP APNEA: Primary | ICD-10-CM

## 2025-03-31 DIAGNOSIS — R63.5 WEIGHT GAIN: ICD-10-CM

## 2025-03-31 DIAGNOSIS — N52.9 ERECTILE DYSFUNCTION, UNSPECIFIED ERECTILE DYSFUNCTION TYPE: ICD-10-CM

## 2025-03-31 DIAGNOSIS — Z12.5 PROSTATE CANCER SCREENING: ICD-10-CM

## 2025-03-31 DIAGNOSIS — I73.9 PVD (PERIPHERAL VASCULAR DISEASE): ICD-10-CM

## 2025-03-31 DIAGNOSIS — R60.0 EDEMA LEG: ICD-10-CM

## 2025-03-31 DIAGNOSIS — R06.83 SNORES: ICD-10-CM

## 2025-03-31 DIAGNOSIS — R35.1 NOCTURIA: ICD-10-CM

## 2025-03-31 DIAGNOSIS — Z79.899 REFERRED FOR MANAGEMENT OF MEDICATION THERAPY: ICD-10-CM

## 2025-03-31 DIAGNOSIS — Z74.09 IMPAIRED FUNCTIONAL MOBILITY, BALANCE, GAIT, AND ENDURANCE: ICD-10-CM

## 2025-03-31 PROCEDURE — 99214 OFFICE O/P EST MOD 30 MIN: CPT | Performed by: FAMILY MEDICINE

## 2025-03-31 NOTE — PROGRESS NOTES
Subjective:       Patient ID: Aureliano Wilson is a 65 y.o. male.    Chief Complaint: Annual Exam    HPI     presenting with multiple complaints.  Bilateral Swollen Ankles: The patient reports swelling in both ankles, which he notices is worse at the end of his workday. 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 The swelling improves once he starts walking around.  Metallic Taste and Cough: The patient has been experiencing a metallic taste in his mouth along with a cough for about a month. He denies fever and shortness of breath, and the cough is non-productive.  Balance Issues: The patient describes feeling a weakness in his legs, stating that he lacks the strength to walk once he starts moving.  Chronic Fatigue: He reports feeling constantly tired and lacking energy, which has impacted his ability to go to the gym, an activity he previously did 2-3 times a week.  Irritability: The patient notes increased irritability with his wife, which he attributes to his current condition.  Dehydration: The patient reports feeling persistently thirsty with a sensation of cotton mouth. He denies polyphagia and polydipsia but endorses nocturia with approximately six urinations per night.  Seizure Disorder: The patient has a history of seizure disorder diagnosed approximately two years ago. He has been seizure-free since his last seizure and attributes some of his fatigue and cotton mouth to his seizure medication.  Weight Gain: The patient has noticed weight gain since his last visit. His wife, who was on the phone during the visit, mentioned that the patient occasionally indulges in dietary indiscretions that could contribute to the weight gain.    Review of Systems      Past Medical History:   Diagnosis Date    Epilepsy, unspecified, not intractable, with status epilepticus     Erectile dysfunction        Family History   Problem Relation Name Age of Onset    Diabetes Father      Stroke Father      Diabetes Mother    "      Social History     Socioeconomic History    Marital status:    Tobacco Use    Smoking status: Every Day     Current packs/day: 0.25     Average packs/day: 0.3 packs/day for 30.0 years (7.5 ttl pk-yrs)     Types: Cigarettes    Smokeless tobacco: Current    Tobacco comments:     6 cigarettes a day   Substance and Sexual Activity    Alcohol use: Not Currently     Alcohol/week: 2.0 standard drinks of alcohol     Types: 2 Glasses of wine per week     Comment: socially    Drug use: No    Sexual activity: Yes     Partners: Female       Past Surgical History:   Procedure Laterality Date    COLONOSCOPY N/A 6/5/2017    Procedure: COLONOSCOPY;  Surgeon: Tung Cochran MD;  Location: [facility] ENDO;  Service: Endoscopy;  Laterality: N/A;    COLONOSCOPY N/A 8/5/2024    Procedure: COLONOSCOPY;  Surgeon: Tung Cochran MD;  Location: [facility] ENDO;  Service: Endoscopy;  Laterality: N/A;       Current Medications[1]    Checklist of Daily Activities:   independent for UE/LE dressing, toileting, brush teeth, and washface with no assistive devices.  Able to preform shopping for groceries, driving or using public transportation, using the telephone, meal preparation, housework, home repair, laundry, taking medications, handling finances.      PRESCRIPTION DRUG MONITORING:      [place]  [place]  Site reviewed - No recent discrepancies or irregularities are noted.          6 points  STOP-BANG    Objective:      Body mass index is 29.76 kg/m².  Vitals:    03/31/25 1019   BP: (!) 125/95   Pulse: 93   Weight: 96.8 kg (213 lb 6.5 oz)   Height: 5' 11" (1.803 m)   PainSc: 0-No pain     Physical Exam  Vitals reviewed.   Constitutional:       Appearance: Normal appearance.   HENT:      Head: Normocephalic and atraumatic.   Eyes:      General: No scleral icterus.     Conjunctiva/sclera: Conjunctivae normal.   Cardiovascular:      Rate and Rhythm: Normal rate and regular rhythm.      Pulses: Normal pulses.      Heart sounds: " Normal heart sounds.   Pulmonary:      Effort: Pulmonary effort is normal.      Breath sounds: Normal breath sounds.   Musculoskeletal:         General: No swelling, tenderness, deformity or signs of injury. Normal range of motion.      Cervical back: Normal range of motion and neck supple.      Right lower leg: No edema.      Left lower leg: No edema.   Skin:     General: Skin is warm.      Capillary Refill: Capillary refill takes less than 2 seconds.      Coloration: Skin is not jaundiced.      Findings: No bruising or erythema.   Neurological:      General: No focal deficit present.      Mental Status: He is alert and oriented to person, place, and time.      Cranial Nerves: No cranial nerve deficit.      Sensory: Sensation is intact. No sensory deficit.      Motor: Motor function is intact. No weakness, tremor or pronator drift.      Coordination: Romberg sign negative. Coordination normal. Finger-Nose-Finger Test and Heel to Shin Test normal. Rapid alternating movements normal.      Gait: Gait normal.      Deep Tendon Reflexes: Reflexes are normal and symmetric. Reflexes normal.   Psychiatric:         Mood and Affect: Mood normal.         Behavior: Behavior normal.         Thought Content: Thought content normal.         Judgment: Judgment normal.       Assessment:       1. PVD (peripheral vascular disease)    2. Fatigue, unspecified type    3. Prostate cancer screening    4. Edema leg    5. Impaired functional mobility, balance, gait, and endurance    6. Erectile dysfunction, unspecified erectile dysfunction type    7. Nocturia    8. Weight gain    9. Referred for management of medication therapy        Plan:       1. Peripheral Vascular Disease (PVD)  Assessment: Patient presents with signs concerning for PVD, including possible arterial insufficiency.  Plan:  Diagnostics: US Ankle-Brachial Indices (THIERNO) scheduled for 03/31/2025.  Management:  Encourage smoking cessation (if applicable).  Advise on regular  exercise (e.g., walking program).  Consider starting antiplatelet therapy if indicated.  Monitor for claudication symptoms and refer to vascular surgery if severe.    2. Fatigue, Unspecified  Assessment: Multifactorial fatigue, possible endocrine/metabolic etiology.  Plan:  Diagnostics: TSH, Hemoglobin A1c, and Free Testosterone scheduled for 03/31/2025.  Management:  Optimize sleep hygiene and physical activity.  Address potential medication-related contributors.  Consider referral to behavioral health if psychosocial factors are suspected.    3. Prostate Cancer Screening  Assessment: Age-appropriate PSA screening.  Plan:  Diagnostics: PSA screening scheduled for 03/31/2025.  Management: Follow up based on PSA results and shared decision-making regarding further evaluation.    4. Leg Edema  Assessment: Likely multifactorial, considering venous insufficiency, medication-induced edema, or systemic causes (cardiac, renal, hepatic).  Plan:  Diagnostics:  CMP for renal/liver function.  Doppler ultrasound if DVT suspected.  Management:  Encourage frequent movement and leg elevation.  Consider compression stockings.  Adjust medications if contributing to edema.    5. Impaired Functional Mobility, Balance, Gait, & Endurance  Assessment: Possible neurologic, metabolic, or orthopedic etiology.  Plan:  Diagnostics:  Neurological exam WNL.  Vitamin B12, TSH, CBC, Electrolytes (CMP).  Management:  Ensure adequate hydration and nutrition.  Address electrolyte abnormalities.  Consider physical therapy referral if needed.    6. Erectile Dysfunction (ED), Unspecified  Assessment: Possible hormonal or vascular cause.  Plan:  Diagnostics: Free Testosterone scheduled for 03/31/2025.  Management:  Address modifiable risk factors (e.g., weight, metabolic syndrome).  Consider PDE-5 inhibitors if no contraindications.    7. Nocturia  Assessment: Potentially due to metabolic/endocrine dysfunction, fluid intake, or medication  effects.  Plan:  Diagnostics: BMP (electrolytes, renal function), Urinalysis.  Management:  Encourage hydration monitoring.  Adjust fluid intake before bedtime.  Consider evaluating for sleep apnea if symptoms suggestive.    8. Weight Gain  Assessment: Likely multifactorial--dietary habits, reduced physical activity, metabolic contributors.  Plan:  Diagnostics: TSH, Hemoglobin A1c scheduled for 03/31/2025.  Management:  Encourage dietary modifications and increased physical activity.  Consider nutrition consultation.    9. Metallic Taste & Cough  Assessment: Possible GERD, postnasal drip, medication side effect, or environmental exposure.  Plan:  Diagnostics: Consider trial of PPI if GERD suspected.  Management:  Encourage hydration.  Avoid acidic/spicy foods.  Assess and adjust medications if implicated.  Consider allergy treatment if postnasal drip suspected.    10. Low Testosterone  Assessment: Possible contributor to fatigue, ED, and weight gain.  Plan:  Diagnostics: Serum testosterone (morning, 7-10 AM), Free and Bioavailable Testosterone if total is low.  Management:  Consider endocrine referral if hypogonadism confirmed.  Address lifestyle factors contributing to hormonal imbalance.    11. Chronic Fatigue & Irritability  Assessment: Possible endocrine/metabolic etiology, sleep disturbance, or psychosocial factors.  Plan:  Diagnostics: TSH, CBC, Iron Panel, Depression/Anxiety Screening, Sleep Apnea Assessment.  Management:  Optimize sleep hygiene and encourage physical activity.  Consider adjusting seizure medication if contributing.  Refer to behavioral health if mood disorder suspected.    12. Seizure Disorder  Assessment: Seizure-free but experiencing possible medication side effects (fatigue, dry mouth).  Plan:  Management:  Consider medication review with neurology if symptoms persist.  Monitor adherence and assess seizure triggers.      Follow-Up Plan:  Re-evaluate in 1 month after initial  workup.  Adjust treatment plan based on lab results and symptom progression.  Consider neurology or endocrinology referral if needed.              Goal BP<140/90  Goal BMI <30    A total of 25 minutes were spent face-to-face with the patient during this encounter and over half of that time was spent on counseling and coordination of care. We discussed in depth the importance of adherence low salt diet and exercise. I also educated the patient about lifestyle modifications which may improve blood pressure.          [1]    Current Outpatient Medications:     levETIRAcetam (KEPPRA) 750 MG Tab, Take 1 tablet (750 mg total) by mouth 2 (two) times daily., Disp: 180 tablet, Rfl: 3    atorvastatin (LIPITOR) 40 MG tablet, Take 40 mg by mouth. (Patient not taking: Reported on 6/21/2024), Disp: , Rfl:

## 2025-04-01 ENCOUNTER — LAB VISIT (OUTPATIENT)
Dept: LAB | Facility: HOSPITAL | Age: 65
End: 2025-04-01
Attending: FAMILY MEDICINE
Payer: COMMERCIAL

## 2025-04-01 DIAGNOSIS — Z12.5 PROSTATE CANCER SCREENING: ICD-10-CM

## 2025-04-01 DIAGNOSIS — R35.1 NOCTURIA: ICD-10-CM

## 2025-04-01 DIAGNOSIS — R63.5 WEIGHT GAIN: ICD-10-CM

## 2025-04-01 DIAGNOSIS — N52.9 ERECTILE DYSFUNCTION, UNSPECIFIED ERECTILE DYSFUNCTION TYPE: ICD-10-CM

## 2025-04-01 DIAGNOSIS — R53.83 FATIGUE, UNSPECIFIED TYPE: ICD-10-CM

## 2025-04-01 DIAGNOSIS — Z79.899 REFERRED FOR MANAGEMENT OF MEDICATION THERAPY: ICD-10-CM

## 2025-04-01 LAB
ALBUMIN SERPL BCP-MCNC: 3.5 G/DL (ref 3.5–5.2)
ALP SERPL-CCNC: 68 UNIT/L (ref 40–150)
ALT SERPL W/O P-5'-P-CCNC: 15 UNIT/L (ref 10–44)
ANION GAP (OHS): 7 MMOL/L (ref 8–16)
AST SERPL-CCNC: 13 UNIT/L (ref 11–45)
BILIRUB SERPL-MCNC: 0.5 MG/DL (ref 0.1–1)
BUN SERPL-MCNC: 16 MG/DL (ref 8–23)
CALCIUM SERPL-MCNC: 9.1 MG/DL (ref 8.7–10.5)
CHLORIDE SERPL-SCNC: 109 MMOL/L (ref 95–110)
CO2 SERPL-SCNC: 22 MMOL/L (ref 23–29)
CREAT SERPL-MCNC: 0.9 MG/DL (ref 0.5–1.4)
EAG (OHS): 117 MG/DL (ref 68–131)
GFR SERPLBLD CREATININE-BSD FMLA CKD-EPI: >60 ML/MIN/1.73/M2
GLUCOSE SERPL-MCNC: 97 MG/DL (ref 70–110)
HBA1C MFR BLD: 5.7 % (ref 4–5.6)
POTASSIUM SERPL-SCNC: 3.9 MMOL/L (ref 3.5–5.1)
PROT SERPL-MCNC: 6.9 GM/DL (ref 6–8.4)
PSA SERPL-MCNC: 4.8 NG/ML
SODIUM SERPL-SCNC: 138 MMOL/L (ref 136–145)
T4 FREE SERPL-MCNC: NORMAL NG/DL
TSH SERPL-ACNC: 1.77 UIU/ML (ref 0.4–4)

## 2025-04-01 PROCEDURE — 36415 COLL VENOUS BLD VENIPUNCTURE: CPT

## 2025-04-01 PROCEDURE — 83036 HEMOGLOBIN GLYCOSYLATED A1C: CPT

## 2025-04-01 PROCEDURE — 84402 ASSAY OF FREE TESTOSTERONE: CPT

## 2025-04-01 PROCEDURE — 80053 COMPREHEN METABOLIC PANEL: CPT

## 2025-04-01 PROCEDURE — 84153 ASSAY OF PSA TOTAL: CPT

## 2025-04-01 PROCEDURE — 84443 ASSAY THYROID STIM HORMONE: CPT

## 2025-04-07 ENCOUNTER — RESULTS FOLLOW-UP (OUTPATIENT)
Dept: HEPATOLOGY | Facility: HOSPITAL | Age: 65
End: 2025-04-07
Payer: COMMERCIAL

## 2025-04-07 ENCOUNTER — TELEPHONE (OUTPATIENT)
Dept: FAMILY MEDICINE | Facility: HOSPITAL | Age: 65
End: 2025-04-07
Payer: COMMERCIAL

## 2025-04-07 ENCOUNTER — HOSPITAL ENCOUNTER (OUTPATIENT)
Dept: RADIOLOGY | Facility: HOSPITAL | Age: 65
Discharge: HOME | End: 2025-04-07
Attending: FAMILY MEDICINE
Payer: COMMERCIAL

## 2025-04-07 DIAGNOSIS — I73.9 PVD (PERIPHERAL VASCULAR DISEASE): ICD-10-CM

## 2025-04-07 LAB — W FREE TESTOSTERONE: 13.4 PG/ML

## 2025-04-07 PROCEDURE — 93922 UPR/L XTREMITY ART 2 LEVELS: CPT | Mod: 26,,, | Performed by: RADIOLOGY

## 2025-04-07 PROCEDURE — 93922 UPR/L XTREMITY ART 2 LEVELS: CPT | Mod: TC

## 2025-04-07 NOTE — TELEPHONE ENCOUNTER
Returned patients call advised patient that we have received messages Dr. Walton is in clinic but is aware of this. Patient verbalized understanding.       ----- Message from Mitzy sent at 4/7/2025 10:48 AM CDT -----  Type: Orders Who Called: Pt Does the patient know what this is regarding?: currently at [facility], discrepancies with orders, requesting to speak to nurse Would the patient rather a call back or a response via [facility]? Call Best Call Back Number:[phone] Additional Information:

## 2025-04-08 ENCOUNTER — OFFICE VISIT (OUTPATIENT)
Dept: SLEEP MEDICINE | Facility: CLINIC | Age: 65
End: 2025-04-08
Attending: PSYCHIATRY & NEUROLOGY
Payer: COMMERCIAL

## 2025-04-08 VITALS
BODY MASS INDEX: 29.86 KG/M2 | DIASTOLIC BLOOD PRESSURE: 92 MMHG | HEART RATE: 85 BPM | SYSTOLIC BLOOD PRESSURE: 149 MMHG | WEIGHT: 214.06 LBS

## 2025-04-08 DIAGNOSIS — G47.30 SLEEP APNEA, UNSPECIFIED TYPE: ICD-10-CM

## 2025-04-08 DIAGNOSIS — R06.83 SNORES: ICD-10-CM

## 2025-04-08 DIAGNOSIS — E66.3 OVERWEIGHT WITH BODY MASS INDEX (BMI) OF 29 TO 29.9 IN ADULT: ICD-10-CM

## 2025-04-08 PROCEDURE — 99999 PR PBB SHADOW E&M-EST. PATIENT-LVL III: CPT | Mod: PBBFAC,,,

## 2025-04-08 NOTE — PROGRESS NOTES
CC: Snoring     Referred by Shakeel Walton III, MD for a sleep evaluation.     HPI     Subjective    HPI:  Sleep Apnea:  Symptoms:    [x] Loud snoring[x] Witnessed apneas [x] Gasping [x] Choking[x] Interrupted Sleep [x]  Nocturia [x]  Non refreshing sleep [x] Excessive daytime sleepiness   []  Morning headaches [] Nasal Congestion [x] Dry Mouth [] Excessive Daytime Fatigue [] None  Duration:   [] Days  [] Months  [x] Years  Comments:     Wake up Feeling: [] Refreshed  [x] Non refreshed [] Occasionally Tired  Do You Take Naps: [x] Yes [] No Number of Naps: 1     The patient was screened and denies symptoms concerning: insomnia, RLS, parasomnias, and narcolepsy/IH    Daytime Impact:  Falling Asleep: at Work/School [] Yes [x] No   Falling Asleep While Driving: [] Yes [x] No   Interfering With:    [] Short term memory [] Concentration  [] Work  [] Social life [x] None        4/8/2025     3:03 PM   EPWORTH SLEEPINESS SCALE TOTAL SCORE    Total score 11     (Validated Sleepiness Questionnaire with higher score indicating greater sleepiness (0-24)    STOP BANG Questionnaire  Patient diagnosed with Obstructive Sleep Apnea?: No  Has loud snoring: Yes  Disturbed sleep, daytime fatigue, daytime somnolence: Yes  Observed to have interrupted breathing during sleep: Yes  Takes medication for high blood pressure: No  Not taking BP medication but supposed to be: No  Recent BMI (Calculated): 29.8  Is BMI greater than 35 kg/m2?: 0=No  Age older than 50 years old?: 1=Yes  Has large neck size >40cm (15.7in., large male shirt size, large male collar size >16): Yes  Gender - Male: 1=Yes  STOP-Bang Total Score: 6  (Validated Stop Bang Questionnaire with higher score indicating greater risk of NEVIN (0-2, 3-4, 5-8)) Risk: High         No data to display                Current Sleep Medication(s): None     Previous Sleep Medication(s): None     Sleep Factors:  Sleep Environment: Cool, Dark, Comfortable, and TV on while asleep  Caffeine: 1  "beverages, last beverage at  am  Bed Partner: spouse, living together  Alcohol: none  Sleep Disorder Family History: None  Sleep Problems: naps during the day        Objective    Records Reviewed:   Lab Results   Component Value Date    TSH 1.769 04/01/2025    CO2 22 (L) 04/01/2025    HGBA1C 5.7 (H) 04/01/2025      No echocardiogram results found for the past 12 months  Sleep Studies : None    Objective:  Vitals: Blood pressure (!) 149/92, pulse 85, weight 97.1 kg (214 lb 1.1 oz).   Exam:  Mallampati Score: II (hard and soft palate, upper portion of tonsils anduvula visible)  Neck: Size:  16.5"  Gen: Well Appearing, demonstrates insight  Skin: No rash or lesions on bridge of nose or mouth          Assessment & Plan  Overweight with body mass index (BMI) of 29 to 29.9 in adult  Discussed how weight gain may worsen NEVIN.  Orders:    Ambulatory referral/consult to Sleep Disorders    Snores    Orders:    Ambulatory referral/consult to Sleep Disorders    Sleep apnea, unspecified type  Diagnostic Testing: Home Sleep Apnea Test (HST) is indicated due to comorbid conditions: Obesity with symptoms: snoring, gasping for air, choking , witnessed apneas, interrupted sleep, nocturia, restless sleep, non refreshing sleep, excessive daytime sleepiness, ESS: 11/24, and excessive daytime fatigue  Education & Treatment Options:  Discussed the etiology and consequences of untreated NEVIN, including risks of cardiovascular disease, hypertension, stroke, metabolic dysfunction, and excessive daytime sleepiness.  Reviewed treatment options:  Positive Airway Pressure (PAP) therapy, Weight loss Positional therapy, Oral appliance therapy, Inspire therapy (hypoglossal nerve stimulation)   Lifestyle Modifications: Advised weight management, alcohol reduction, and optimizing sleep hygiene.  Safety Precautions: Recommended avoiding driving if experiencing excessive daytime sleepiness.  Next Steps:  If NEVIN is confirmed, patient agrees to trial " APAP  If PAP therapy is initiated, follow-up within 31-90 days to assess compliance and effectiveness.  Results will be communicated by Mychart   Orders:    Ambulatory referral/consult to Sleep Disorders    Home Sleep Study; Future

## 2025-04-08 NOTE — PATIENT INSTRUCTIONS
SLEEP LAB ([facility] or [facility]) will contact you to schedule the sleep study. Their number is [phone] (ext [phone]). Please call them if you do not hear from them in 2 weeks from now.  [facility] is located on 7th floor of the [facility]; [facility] is located in [facility] ( 3rd floor [facility]).    SLEEP CLINIC (my assistant) will call you when the sleep study results are ready or I will message you through the portal with the results as we have discussed - if you have not heard from us by 2 weeks from the date of the study, or you can use [facility] to contact me.    Our clinic phone number is [phone] (ext 1)       You are advised to abstain from driving should you feel sleepy or drowsy.

## 2025-07-30 ENCOUNTER — OFFICE VISIT (OUTPATIENT)
Dept: URGENT CARE | Facility: CLINIC | Age: 65
End: 2025-07-30
Payer: MEDICARE

## 2025-07-30 VITALS
BODY MASS INDEX: 29.96 KG/M2 | SYSTOLIC BLOOD PRESSURE: 135 MMHG | WEIGHT: 214 LBS | HEART RATE: 90 BPM | DIASTOLIC BLOOD PRESSURE: 73 MMHG | TEMPERATURE: 98 F | HEIGHT: 71 IN | RESPIRATION RATE: 18 BRPM

## 2025-07-30 DIAGNOSIS — R68.2 DRY MOUTH: ICD-10-CM

## 2025-07-30 DIAGNOSIS — T30.0 ELECTRICAL BURN OF SKIN: Primary | ICD-10-CM

## 2025-07-30 LAB
BILIRUBIN, UA POC OHS: NEGATIVE
BLOOD, UA POC OHS: ABNORMAL
CLARITY, UA POC OHS: CLEAR
COLOR, UA POC OHS: YELLOW
GLUCOSE, UA POC OHS: NEGATIVE
KETONES, UA POC OHS: ABNORMAL
LEUKOCYTES, UA POC OHS: NEGATIVE
NITRITE, UA POC OHS: NEGATIVE
PH, UA POC OHS: 7
PROTEIN, UA POC OHS: 30
SPECIFIC GRAVITY, UA POC OHS: 1.02
UROBILINOGEN, UA POC OHS: 0.2

## 2025-07-30 PROCEDURE — 81003 URINALYSIS AUTO W/O SCOPE: CPT | Mod: QW,S$GLB,,

## 2025-07-30 PROCEDURE — 99213 OFFICE O/P EST LOW 20 MIN: CPT | Mod: S$GLB,,,

## 2025-07-30 RX ORDER — MUPIROCIN 20 MG/G
OINTMENT TOPICAL 3 TIMES DAILY
Qty: 15 G | Refills: 0 | Status: SHIPPED | OUTPATIENT
Start: 2025-07-30 | End: 2025-08-06
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
## (undated) DEVICE — ADHESIVE DERMABOND ADVANCED

## (undated) DEVICE — DISSECTOR SPACEMAKER + 10-12MM

## (undated) DEVICE — KITTNER ENDOCSCOPIC

## (undated) DEVICE — SUT VICRYL 3-0 27 SH

## (undated) DEVICE — SPONGE DERMA 8PLY 2X2

## (undated) DEVICE — ELECTRODE REM PLYHSV RETURN 9

## (undated) DEVICE — TROCAR ENDOPATH XCEL 11MM 10CM

## (undated) DEVICE — MANIFOLD 4 PORT

## (undated) DEVICE — SUPPORTER ADLT A3 3 IN. WB LA

## (undated) DEVICE — TROCAR ENDOPATH XCEL 5MM 7.5CM

## (undated) DEVICE — SCISSOR 5MMX35CM DIRECT DRIVE

## (undated) DEVICE — SUT 0 VICRYL / UR6 (J603)

## (undated) DEVICE — GLOVE SURGICAL LATEX SZ 7

## (undated) DEVICE — SUT ENDOLOOP PDSII 18 LIGA

## (undated) DEVICE — DISSECTOR 5MM ENDOPATH

## (undated) DEVICE — SEE MEDLINE ITEM 156952

## (undated) DEVICE — TROCAR ENDOPATH XCEL 5X75MM

## (undated) DEVICE — NDL 22GA X1 1/2 REG BEVEL

## (undated) DEVICE — TRAY FOLEY 16FR INFECTION CONT